# Patient Record
Sex: FEMALE | Race: WHITE | NOT HISPANIC OR LATINO | URBAN - METROPOLITAN AREA
[De-identification: names, ages, dates, MRNs, and addresses within clinical notes are randomized per-mention and may not be internally consistent; named-entity substitution may affect disease eponyms.]

---

## 2024-01-23 ENCOUNTER — TRANSFERRED RECORDS (OUTPATIENT)
Dept: HEALTH INFORMATION MANAGEMENT | Facility: CLINIC | Age: 59
End: 2024-01-23

## 2024-02-27 ENCOUNTER — TRANSFERRED RECORDS (OUTPATIENT)
Dept: HEALTH INFORMATION MANAGEMENT | Facility: CLINIC | Age: 59
End: 2024-02-27

## 2024-04-05 ENCOUNTER — TRANSFERRED RECORDS (OUTPATIENT)
Dept: HEALTH INFORMATION MANAGEMENT | Facility: CLINIC | Age: 59
End: 2024-04-05

## 2024-04-24 ENCOUNTER — TRANSFERRED RECORDS (OUTPATIENT)
Dept: HEALTH INFORMATION MANAGEMENT | Facility: CLINIC | Age: 59
End: 2024-04-24

## 2024-06-06 ENCOUNTER — TRANSFERRED RECORDS (OUTPATIENT)
Dept: HEALTH INFORMATION MANAGEMENT | Facility: CLINIC | Age: 59
End: 2024-06-06

## 2024-07-18 ENCOUNTER — TRANSFERRED RECORDS (OUTPATIENT)
Dept: HEALTH INFORMATION MANAGEMENT | Facility: CLINIC | Age: 59
End: 2024-07-18

## 2024-08-21 ENCOUNTER — TRANSFERRED RECORDS (OUTPATIENT)
Dept: HEALTH INFORMATION MANAGEMENT | Facility: CLINIC | Age: 59
End: 2024-08-21

## 2025-01-06 ENCOUNTER — APPOINTMENT (OUTPATIENT)
Dept: MRI IMAGING | Facility: HOSPITAL | Age: 60
DRG: 580 | End: 2025-01-06
Payer: COMMERCIAL

## 2025-01-06 ENCOUNTER — HOSPITAL ENCOUNTER (INPATIENT)
Facility: HOSPITAL | Age: 60
DRG: 580 | End: 2025-01-06
Attending: FAMILY MEDICINE | Admitting: FAMILY MEDICINE
Payer: COMMERCIAL

## 2025-01-06 DIAGNOSIS — L02.512 ABSCESS OF LEFT THUMB: Primary | ICD-10-CM

## 2025-01-06 DIAGNOSIS — K21.00 GASTROESOPHAGEAL REFLUX DISEASE WITH ESOPHAGITIS WITHOUT HEMORRHAGE: ICD-10-CM

## 2025-01-06 DIAGNOSIS — L02.91 PHLEGMONOUS CELLULITIS: ICD-10-CM

## 2025-01-06 DIAGNOSIS — L03.90 CELLULITIS: ICD-10-CM

## 2025-01-06 DIAGNOSIS — G43.909 MIGRAINE WITHOUT STATUS MIGRAINOSUS, NOT INTRACTABLE, UNSPECIFIED MIGRAINE TYPE: ICD-10-CM

## 2025-01-06 LAB
ANION GAP SERPL CALCULATED.3IONS-SCNC: 13 MMOL/L (ref 7–15)
BASOPHILS # BLD AUTO: 0 10E3/UL (ref 0–0.2)
BASOPHILS NFR BLD AUTO: 0 %
BUN SERPL-MCNC: 10.9 MG/DL (ref 8–23)
CALCIUM SERPL-MCNC: 9.7 MG/DL (ref 8.8–10.4)
CHLORIDE SERPL-SCNC: 102 MMOL/L (ref 98–107)
CREAT SERPL-MCNC: 0.73 MG/DL (ref 0.51–0.95)
CRP SERPL-MCNC: 37.3 MG/L
EGFRCR SERPLBLD CKD-EPI 2021: >90 ML/MIN/1.73M2
EOSINOPHIL # BLD AUTO: 0 10E3/UL (ref 0–0.7)
EOSINOPHIL NFR BLD AUTO: 0 %
ERYTHROCYTE [DISTWIDTH] IN BLOOD BY AUTOMATED COUNT: 12.3 % (ref 10–15)
GLUCOSE SERPL-MCNC: 116 MG/DL (ref 70–99)
HCO3 SERPL-SCNC: 24 MMOL/L (ref 22–29)
HCT VFR BLD AUTO: 40.8 % (ref 35–47)
HGB BLD-MCNC: 13.8 G/DL (ref 11.7–15.7)
IMM GRANULOCYTES # BLD: 0 10E3/UL
IMM GRANULOCYTES NFR BLD: 0 %
LYMPHOCYTES # BLD AUTO: 2 10E3/UL (ref 0.8–5.3)
LYMPHOCYTES NFR BLD AUTO: 18 %
MAGNESIUM SERPL-MCNC: 2 MG/DL (ref 1.7–2.3)
MCH RBC QN AUTO: 31.6 PG (ref 26.5–33)
MCHC RBC AUTO-ENTMCNC: 33.8 G/DL (ref 31.5–36.5)
MCV RBC AUTO: 93 FL (ref 78–100)
MONOCYTES # BLD AUTO: 0.7 10E3/UL (ref 0–1.3)
MONOCYTES NFR BLD AUTO: 7 %
NEUTROPHILS # BLD AUTO: 8.4 10E3/UL (ref 1.6–8.3)
NEUTROPHILS NFR BLD AUTO: 75 %
NRBC # BLD AUTO: 0 10E3/UL
NRBC BLD AUTO-RTO: 0 /100
PHOSPHATE SERPL-MCNC: 3 MG/DL (ref 2.5–4.5)
PLATELET # BLD AUTO: 326 10E3/UL (ref 150–450)
POTASSIUM SERPL-SCNC: 4 MMOL/L (ref 3.4–5.3)
PROCALCITONIN SERPL IA-MCNC: 0.03 NG/ML
RBC # BLD AUTO: 4.37 10E6/UL (ref 3.8–5.2)
SODIUM SERPL-SCNC: 139 MMOL/L (ref 135–145)
WBC # BLD AUTO: 11.2 10E3/UL (ref 4–11)

## 2025-01-06 PROCEDURE — 120N000001 HC R&B MED SURG/OB

## 2025-01-06 PROCEDURE — 96375 TX/PRO/DX INJ NEW DRUG ADDON: CPT

## 2025-01-06 PROCEDURE — 85018 HEMOGLOBIN: CPT | Performed by: EMERGENCY MEDICINE

## 2025-01-06 PROCEDURE — 250N000011 HC RX IP 250 OP 636: Mod: JZ | Performed by: EMERGENCY MEDICINE

## 2025-01-06 PROCEDURE — 250N000013 HC RX MED GY IP 250 OP 250 PS 637

## 2025-01-06 PROCEDURE — 73220 MRI UPPR EXTREMITY W/O&W/DYE: CPT | Mod: LT

## 2025-01-06 PROCEDURE — 80048 BASIC METABOLIC PNL TOTAL CA: CPT | Performed by: EMERGENCY MEDICINE

## 2025-01-06 PROCEDURE — 83735 ASSAY OF MAGNESIUM: CPT | Performed by: EMERGENCY MEDICINE

## 2025-01-06 PROCEDURE — 250N000011 HC RX IP 250 OP 636: Performed by: FAMILY MEDICINE

## 2025-01-06 PROCEDURE — 85004 AUTOMATED DIFF WBC COUNT: CPT | Performed by: EMERGENCY MEDICINE

## 2025-01-06 PROCEDURE — 258N000003 HC RX IP 258 OP 636: Performed by: FAMILY MEDICINE

## 2025-01-06 PROCEDURE — 84100 ASSAY OF PHOSPHORUS: CPT | Performed by: FAMILY MEDICINE

## 2025-01-06 PROCEDURE — 36415 COLL VENOUS BLD VENIPUNCTURE: CPT | Performed by: EMERGENCY MEDICINE

## 2025-01-06 PROCEDURE — 84145 PROCALCITONIN (PCT): CPT | Performed by: EMERGENCY MEDICINE

## 2025-01-06 PROCEDURE — 96374 THER/PROPH/DIAG INJ IV PUSH: CPT | Mod: 59

## 2025-01-06 PROCEDURE — 250N000011 HC RX IP 250 OP 636

## 2025-01-06 PROCEDURE — 87040 BLOOD CULTURE FOR BACTERIA: CPT | Performed by: EMERGENCY MEDICINE

## 2025-01-06 PROCEDURE — 86140 C-REACTIVE PROTEIN: CPT | Performed by: EMERGENCY MEDICINE

## 2025-01-06 PROCEDURE — 99285 EMERGENCY DEPT VISIT HI MDM: CPT | Mod: 25

## 2025-01-06 RX ORDER — TRAZODONE HYDROCHLORIDE 50 MG/1
100 TABLET, FILM COATED ORAL AT BEDTIME
Status: DISCONTINUED | OUTPATIENT
Start: 2025-01-06 | End: 2025-01-12 | Stop reason: HOSPADM

## 2025-01-06 RX ORDER — TRAZODONE HYDROCHLORIDE 100 MG/1
100 TABLET ORAL AT BEDTIME
COMMUNITY
Start: 2024-10-07

## 2025-01-06 RX ORDER — SERTRALINE HYDROCHLORIDE 100 MG/1
100 TABLET, FILM COATED ORAL DAILY
Status: DISCONTINUED | OUTPATIENT
Start: 2025-01-07 | End: 2025-01-12 | Stop reason: HOSPADM

## 2025-01-06 RX ORDER — ACETAMINOPHEN 650 MG/1
650 SUPPOSITORY RECTAL EVERY 4 HOURS PRN
Status: DISCONTINUED | OUTPATIENT
Start: 2025-01-06 | End: 2025-01-07

## 2025-01-06 RX ORDER — CEFTRIAXONE 2 G/1
2 INJECTION, POWDER, FOR SOLUTION INTRAMUSCULAR; INTRAVENOUS ONCE
Status: COMPLETED | OUTPATIENT
Start: 2025-01-06 | End: 2025-01-06

## 2025-01-06 RX ORDER — OXYCODONE HYDROCHLORIDE 5 MG/1
5 TABLET ORAL EVERY 4 HOURS PRN
Status: DISCONTINUED | OUTPATIENT
Start: 2025-01-06 | End: 2025-01-07

## 2025-01-06 RX ORDER — LIDOCAINE 40 MG/G
CREAM TOPICAL
Status: DISCONTINUED | OUTPATIENT
Start: 2025-01-06 | End: 2025-01-12 | Stop reason: HOSPADM

## 2025-01-06 RX ORDER — ONDANSETRON 2 MG/ML
4 INJECTION INTRAMUSCULAR; INTRAVENOUS EVERY 6 HOURS PRN
Status: DISCONTINUED | OUTPATIENT
Start: 2025-01-06 | End: 2025-01-12 | Stop reason: HOSPADM

## 2025-01-06 RX ORDER — CALCIUM CARBONATE 500 MG/1
1000 TABLET, CHEWABLE ORAL 4 TIMES DAILY PRN
Status: DISCONTINUED | OUTPATIENT
Start: 2025-01-06 | End: 2025-01-12 | Stop reason: HOSPADM

## 2025-01-06 RX ORDER — ACETAMINOPHEN 325 MG/1
650 TABLET ORAL EVERY 4 HOURS PRN
Status: DISCONTINUED | OUTPATIENT
Start: 2025-01-06 | End: 2025-01-07

## 2025-01-06 RX ORDER — AMOXICILLIN 250 MG
2 CAPSULE ORAL 2 TIMES DAILY PRN
Status: DISCONTINUED | OUTPATIENT
Start: 2025-01-06 | End: 2025-01-11

## 2025-01-06 RX ORDER — DIPHENHYDRAMINE HCL 25 MG
25 CAPSULE ORAL EVERY 6 HOURS PRN
Status: DISCONTINUED | OUTPATIENT
Start: 2025-01-06 | End: 2025-01-12 | Stop reason: HOSPADM

## 2025-01-06 RX ORDER — NALOXONE HYDROCHLORIDE 0.4 MG/ML
0.4 INJECTION, SOLUTION INTRAMUSCULAR; INTRAVENOUS; SUBCUTANEOUS
Status: DISCONTINUED | OUTPATIENT
Start: 2025-01-06 | End: 2025-01-12 | Stop reason: HOSPADM

## 2025-01-06 RX ORDER — PANTOPRAZOLE SODIUM 40 MG/1
40 TABLET, DELAYED RELEASE ORAL
Status: DISCONTINUED | OUTPATIENT
Start: 2025-01-07 | End: 2025-01-12 | Stop reason: HOSPADM

## 2025-01-06 RX ORDER — NALOXONE HYDROCHLORIDE 0.4 MG/ML
0.2 INJECTION, SOLUTION INTRAMUSCULAR; INTRAVENOUS; SUBCUTANEOUS
Status: DISCONTINUED | OUTPATIENT
Start: 2025-01-06 | End: 2025-01-12 | Stop reason: HOSPADM

## 2025-01-06 RX ORDER — MORPHINE SULFATE 4 MG/ML
4 INJECTION, SOLUTION INTRAMUSCULAR; INTRAVENOUS ONCE
Status: COMPLETED | OUTPATIENT
Start: 2025-01-06 | End: 2025-01-06

## 2025-01-06 RX ORDER — CEFTRIAXONE 2 G/1
2 INJECTION, POWDER, FOR SOLUTION INTRAMUSCULAR; INTRAVENOUS EVERY 24 HOURS
Status: DISCONTINUED | OUTPATIENT
Start: 2025-01-07 | End: 2025-01-09

## 2025-01-06 RX ORDER — PROCHLORPERAZINE MALEATE 10 MG
10 TABLET ORAL EVERY 6 HOURS PRN
Status: DISCONTINUED | OUTPATIENT
Start: 2025-01-06 | End: 2025-01-12 | Stop reason: HOSPADM

## 2025-01-06 RX ORDER — AMOXICILLIN 250 MG
1 CAPSULE ORAL 2 TIMES DAILY PRN
Status: DISCONTINUED | OUTPATIENT
Start: 2025-01-06 | End: 2025-01-11

## 2025-01-06 RX ORDER — DIPHENHYDRAMINE HYDROCHLORIDE 50 MG/ML
25 INJECTION INTRAMUSCULAR; INTRAVENOUS EVERY 6 HOURS PRN
Status: DISCONTINUED | OUTPATIENT
Start: 2025-01-06 | End: 2025-01-12 | Stop reason: HOSPADM

## 2025-01-06 RX ORDER — KETOROLAC TROMETHAMINE 15 MG/ML
15 INJECTION, SOLUTION INTRAMUSCULAR; INTRAVENOUS ONCE
Status: COMPLETED | OUTPATIENT
Start: 2025-01-06 | End: 2025-01-06

## 2025-01-06 RX ORDER — ONDANSETRON 4 MG/1
4 TABLET, ORALLY DISINTEGRATING ORAL EVERY 6 HOURS PRN
Status: DISCONTINUED | OUTPATIENT
Start: 2025-01-06 | End: 2025-01-12 | Stop reason: HOSPADM

## 2025-01-06 RX ORDER — ONDANSETRON 2 MG/ML
4 INJECTION INTRAMUSCULAR; INTRAVENOUS ONCE
Status: COMPLETED | OUTPATIENT
Start: 2025-01-06 | End: 2025-01-06

## 2025-01-06 RX ORDER — ACYCLOVIR 400 MG/1
400 TABLET ORAL DAILY
Status: DISCONTINUED | OUTPATIENT
Start: 2025-01-07 | End: 2025-01-12 | Stop reason: HOSPADM

## 2025-01-06 RX ORDER — SERTRALINE HYDROCHLORIDE 100 MG/1
1 TABLET, FILM COATED ORAL DAILY
COMMUNITY
Start: 2024-10-08

## 2025-01-06 RX ADMIN — ONDANSETRON 4 MG: 2 INJECTION INTRAMUSCULAR; INTRAVENOUS at 17:49

## 2025-01-06 RX ADMIN — SODIUM CHLORIDE 2000 MG: 9 INJECTION, SOLUTION INTRAVENOUS at 18:05

## 2025-01-06 RX ADMIN — MORPHINE SULFATE 4 MG: 4 INJECTION, SOLUTION INTRAMUSCULAR; INTRAVENOUS at 17:51

## 2025-01-06 RX ADMIN — ACETAMINOPHEN 650 MG: 325 TABLET ORAL at 20:06

## 2025-01-06 RX ADMIN — CEFTRIAXONE SODIUM 2 G: 2 INJECTION, POWDER, FOR SOLUTION INTRAMUSCULAR; INTRAVENOUS at 17:52

## 2025-01-06 RX ADMIN — KETOROLAC TROMETHAMINE 15 MG: 15 INJECTION, SOLUTION INTRAMUSCULAR; INTRAVENOUS at 20:54

## 2025-01-06 RX ADMIN — DIPHENHYDRAMINE HYDROCHLORIDE 25 MG: 50 INJECTION, SOLUTION INTRAMUSCULAR; INTRAVENOUS at 20:54

## 2025-01-06 RX ADMIN — OXYCODONE HYDROCHLORIDE 5 MG: 5 TABLET ORAL at 20:06

## 2025-01-06 ASSESSMENT — COLUMBIA-SUICIDE SEVERITY RATING SCALE - C-SSRS
6. HAVE YOU EVER DONE ANYTHING, STARTED TO DO ANYTHING, OR PREPARED TO DO ANYTHING TO END YOUR LIFE?: NO
2. HAVE YOU ACTUALLY HAD ANY THOUGHTS OF KILLING YOURSELF IN THE PAST MONTH?: NO
1. IN THE PAST MONTH, HAVE YOU WISHED YOU WERE DEAD OR WISHED YOU COULD GO TO SLEEP AND NOT WAKE UP?: NO

## 2025-01-06 ASSESSMENT — ACTIVITIES OF DAILY LIVING (ADL)
ADLS_ACUITY_SCORE: 41

## 2025-01-06 NOTE — ED TRIAGE NOTES
Referred by [facility] urgency room for evaluation of poss infected left thumb. Thumb very reddened. Started on Saturday.     Triage Assessment (Adult)       Row Name 01/06/25 3517          Triage Assessment    Airway WDL WDL        Respiratory WDL    Respiratory WDL WDL        Skin Circulation/Temperature WDL    Skin Circulation/Temperature WDL X  reddened left thumb        Cardiac WDL    Cardiac WDL WDL        Peripheral/Neurovascular WDL    Peripheral Neurovascular WDL WDL        Cognitive/Neuro/Behavioral WDL    Cognitive/Neuro/Behavioral WDL WDL

## 2025-01-06 NOTE — ED NOTES
Pt reports increased swelling of right thumb/hand/wrist over the past hour and started tingling.  MD aware.

## 2025-01-06 NOTE — PHARMACY-ADMISSION MEDICATION HISTORY
Pharmacist Admission Medication History    Admission medication history is complete. The information provided in this note is only as accurate as the sources available at the time of the update.    Information Source(s): Patient and CareEverywhere/SureScripts via in-person    Pertinent Information: Patient also reported taking multiple vitamins, minerals, and holistic meds, but could not remember the names of all of them    Changes made to PTA medication list:  Added: Omeprazole, sertraline, Ocean nasal spray  Deleted: Azelastine nasal spray, doxycycline, fluocinolone drops  Changed: Trazodone from 50 mg to 100 mg    Allergies reviewed with patient and updates made in EHR: yes    Medication History Completed By: ELISSA WYMAN RPH 1/6/2025 4:58 PM    PTA Med List   Medication Sig Last Dose/Taking    acyclovir (ZOVIRAX) 400 MG tablet Take 400 mg by mouth daily. 1/6/2025 Morning    omeprazole (PRILOSEC) 20 MG DR capsule Take 20 mg by mouth daily. 1/6/2025 Morning    sertraline (ZOLOFT) 100 MG tablet Take 1 tablet by mouth daily. 1/6/2025 Morning    sodium chloride (OCEAN) 0.65 % nasal spray Spray 1 spray into both nostrils daily as needed for congestion. 1/6/2025 Morning    traZODone (DESYREL) 100 MG tablet Take 100 mg by mouth at bedtime. 1/5/2025 Bedtime

## 2025-01-06 NOTE — ED PROVIDER NOTES
"Emergency Department Midlevel Supervisory Note     I had a face to face encounter with this patient seen by the Advanced Practice Provider (JANET). I personally made/approved the management plan and take responsibility for the patient management. I personally saw patient and performed a substantive portion of the visit including all aspects of the medical decision making.     ED Course:  4:43 PM  Petty Durham PA-C staffed patient with me. I agree with their assessment and plan of management, and I will see the patient. I met with the patient to introduce myself, gather additional history, perform my initial exam, and discuss the plan.     EKG: I reviewed and independently interpreted the patient's EKG, with comments made as listed below. Please see scanned EKG for full report.   ***    Procedures:  I was present for the key portions of procedures documented in JANET/midlevel note, see midlevel note for further details.    MDM:  Patient with pain and redness of the left hand for a couple days.  Vitally stable, elevated CRP, mild leukocytosis.       No diagnosis found.    Consults:  I discussed the patient with *** who recommends ***    Brief HPI:     Arlene Dominguez is a 59 year old female who presents for evaluation of left hand pain and swelling.    Brief Physical Exam: BP (!) 144/74   Pulse 92   Temp 98.5  F (36.9  C) (Oral)   Resp 18   Ht 1.651 m (5' 5\")   Wt 102.6 kg (226 lb 3.2 oz)   SpO2 94%   BMI 37.64 kg/m    Physical Exam      Labs and Imaging:  Results for orders placed or performed during the hospital encounter of 01/06/25   Basic metabolic panel   Result Value Ref Range    Sodium 139 135 - 145 mmol/L    Potassium 4.0 3.4 - 5.3 mmol/L    Chloride 102 98 - 107 mmol/L    Carbon Dioxide (CO2) 24 22 - 29 mmol/L    Anion Gap 13 7 - 15 mmol/L    Urea Nitrogen 10.9 8.0 - 23.0 mg/dL    Creatinine 0.73 0.51 - 0.95 mg/dL    GFR Estimate >90 >60 mL/min/1.73m2    Calcium 9.7 8.8 - 10.4 mg/dL    Glucose 116 (H) " 70 - 99 mg/dL   Result Value Ref Range    Procalcitonin 0.03 <0.50 ng/mL   Result Value Ref Range    Magnesium 2.0 1.7 - 2.3 mg/dL   Result Value Ref Range    CRP Inflammation 37.30 (H) <5.00 mg/L   CBC with platelets and differential   Result Value Ref Range    WBC Count 11.2 (H) 4.0 - 11.0 10e3/uL    RBC Count 4.37 3.80 - 5.20 10e6/uL    Hemoglobin 13.8 11.7 - 15.7 g/dL    Hematocrit 40.8 35.0 - 47.0 %    MCV 93 78 - 100 fL    MCH 31.6 26.5 - 33.0 pg    MCHC 33.8 31.5 - 36.5 g/dL    RDW 12.3 10.0 - 15.0 %    Platelet Count 326 150 - 450 10e3/uL    % Neutrophils 75 %    % Lymphocytes 18 %    % Monocytes 7 %    % Eosinophils 0 %    % Basophils 0 %    % Immature Granulocytes 0 %    NRBCs per 100 WBC 0 <1 /100    Absolute Neutrophils 8.4 (H) 1.6 - 8.3 10e3/uL    Absolute Lymphocytes 2.0 0.8 - 5.3 10e3/uL    Absolute Monocytes 0.7 0.0 - 1.3 10e3/uL    Absolute Eosinophils 0.0 0.0 - 0.7 10e3/uL    Absolute Basophils 0.0 0.0 - 0.2 10e3/uL    Absolute Immature Granulocytes 0.0 <=0.4 10e3/uL    Absolute NRBCs 0.0 10e3/uL         Steve Underwood M.D.  [facility] EMERGENCY DEPARTMENT  [address] [place] [id number] [phone]

## 2025-01-06 NOTE — PHARMACY-VANCOMYCIN DOSING SERVICE
Pharmacy Vancomycin Initial Note  Date of Service 2025  Patient's  1965  59 year old, female    Indication: Skin and Soft Tissue Infection (cellulitis, possible flexor tenosynovitis)    Current estimated CrCl = Estimated Creatinine Clearance: 98.5 mL/min (based on SCr of 0.73 mg/dL).    Creatinine for last 3 days  2025:  3:30 PM Creatinine 0.73 mg/dL    Recent Vancomycin Level(s) for last 3 days  No results found for requested labs within last 3 days.      Vancomycin IV Administrations (past 72 hours)        No vancomycin orders with administrations in past 72 hours.                    Nephrotoxins and other renal medications (From now, onward)      Start     Dose/Rate Route Frequency Ordered Stop    25 1700  vancomycin (VANCOCIN) 2,000 mg in 0.9% NaCl 520 mL intermittent infusion         20 mg/kg × 102.6 kg  over 2 Hours Intravenous ONCE 25 1653              Contrast Orders - past 72 hours (72h ago, onward)      None            Plan:  Start vancomycin 2000 mg IV x1  Please reconsult pharmacy if planning to continue inpatient     ELISSA WYMAN, [facility]

## 2025-01-06 NOTE — ED PROVIDER NOTES
EMERGENCY DEPARTMENT ENCOUNTER      NAME: Arlene Dominguez  AGE: 59 year old female  YOB: 1965  MRN: [id number]  EVALUATION DATE & TIME: 1/6/2025  4:29 PM    PCP: Lauren Bowens    ED PROVIDER: Petty Durham PA-C      Chief Complaint   Patient presents with    poss infected thumb    reddened thumb         FINAL IMPRESSION:  1. Cellulitis          MEDICAL DECISION MAKING:    Pertinent Labs & Imaging studies reviewed. (See chart for details)  59 year old female presents to the Emergency Department for evaluation of left thumb/hand redness, pain and swelling.  The patient reports having a small cyst to her left hand/thumb that has been present for several years.  It Became red, swollen and painful over the weekend and worse today.  She went to urgent care see room and was referred to the ER for further evaluation.  On my examination, patient was initially hypertensive at 144/74 but otherwise vitally stable.  Examination with 2+ radial pulses.  Erythema, tenderness send mild fluctuance to the palmar aspect of the left thumb as pictured below.  Unable to flex the thumb however distal CMS otherwise intact.  Able to flex all other digits of the left hand.  Mild dorsal swelling of the left hand over the first and second metacarpals.  No significant erythema.  Differential diagnosis included flexor tenosynovitis, cellulitis, abscess, septic joint.    Workup was initiated in the waiting room due to critical capacity and I first initiated contact with the patient in the lobby because of this as well.  CBC with slightly elevated white blood cell count of 11.2.  Glucose minimally elevated at 116.  Procalcitonin negative at 0.03.  Magnesium within normal limits.  CRP elevated at 37.30.  Blood cultures sent and pending at this time.  I spoke with  from hand surgery at [facility] who after discussion of patient's history and exam agrees with admission and will plan for OR tomorrow.  Will give IV  ceftriaxone and vancomycin for coverage.  Pain controlled here with medications.  Discussed need for admission with patient and they were in agreement understanding.  Spoke with the resident service who accepted for admission.    Medical Decision Making  Obtained supplemental history:Supplemental history obtained?: No  Reviewed external records: External records reviewed?: Outpatient Record: 1/6/2024, [facility] impacted by chronic illness:Documented in Chart  Did you consider but not order tests?: Work up considered but not performed and documented in chart, if applicable  Did you interpret images independently?: Independent interpretation of ECG and images noted in documentation, when applicable.  Consultation discussion with other provider:Did you involve another provider (consultant, MH, pharmacy, etc.)?: I discussed the care with another health care provider, see documentation for details.  Admit.    MIPS: Not Applicable       ED COURSE:  4:15 PM I met with the patient, obtained history, performed an initial exam, and discussed options and plan for diagnostics and treatment here in the ED.  5:12 PM I spoke with Dr. Gomez, Orthopedics, regarding the patient's care. Recommended MRI, admission and NPO at midnight planning for procedure tomorrow.  5:52 PM I spoke with the resident service who accepted for admission.     At the conclusion of the encounter I discussed the results of all of the tests and the disposition. The questions were answered. The patient or family acknowledged understanding and was agreeable with the care plan.     MEDICATIONS GIVEN IN THE EMERGENCY:  Medications   vancomycin (VANCOCIN) 2,000 mg in 0.9% NaCl 520 mL intermittent infusion (2,000 mg Intravenous $New Bag 1/6/25 1806)   morphine (PF) injection 4 mg (4 mg Intravenous $Given 1/6/25 0055)   ondansetron (ZOFRAN) injection 4 mg (4 mg Intravenous $Given 1/6/25 7071)   cefTRIAXone (ROCEPHIN) 2 g vial to attach to   ml bag for ADULTS or NS 50 ml bag for PEDS (0 g Intravenous Stopped 1/6/25 1810)       NEW PRESCRIPTIONS STARTED AT TODAY'S ER VISIT  New Prescriptions    No medications on file            =================================================================    HPI:    Patient information was obtained from: Patient    Use of Interpretor: N/A         Arlene Dominguez is a 59 year old female with a pertinent history of anxiety, depression, GERD, osteoarthritis of hand, TMJ disease who presents to this ED by walk-in for evaluation of a possible thumb infection with redness.     Per chart review: Patient was seen earlier today (1/6/2025) at the [facility] for evaluation of significant soft tissue swelling and erythema of her left thumb and hand area. Due to a history and exam concerning for deep space infection/cellulitis, patient was advised to present to the emergency department.     Patient reports that she has had a cyst at the base of her left thumb for a few years now, however, this has since begun to swell significantly over the past few months and has swollen to an even greater extent today, prompting her Urgent Care visit as noted above. This swollen area became erythematous on 1/4/2025 (two days ago) and has been very painful for her recently. She has also experienced a new sensation of tingling from her left wrist to left thumb with this as well. Now in the emergency department, the patient is unable to flex her left thumb due to the swelling. Patient states that she has not attempted to pop or interfere with the swollen area in any significant manner, although she does mention having applied tea tree oil on it prior to arrival.     Patient denies a fever or any other symptoms at this time.       REVIEW OF SYSTEMS:  Negative unless otherwise stated in the above HPI.       PAST MEDICAL HISTORY:  No past medical history on file.    PAST SURGICAL HISTORY:  No past surgical history on  "file.        CURRENT MEDICATIONS:      Current Facility-Administered Medications:     vancomycin (VANCOCIN) 2,000 mg in 0.9% NaCl 520 mL intermittent infusion, 20 mg/kg, Intravenous, Once, Steve Underwood MD, 2,000 mg at 01/06/25 1804    Current Outpatient Medications:     acyclovir (ZOVIRAX) 400 MG tablet, Take 400 mg by mouth daily., Disp: , Rfl:     omeprazole (PRILOSEC) 20 MG DR capsule, Take 20 mg by mouth daily., Disp: , Rfl:     sertraline (ZOLOFT) 100 MG tablet, Take 1 tablet by mouth daily., Disp: , Rfl:     sodium chloride (OCEAN) 0.65 % nasal spray, Spray 1 spray into both nostrils daily as needed for congestion., Disp: , Rfl:     traZODone (DESYREL) 100 MG tablet, Take 100 mg by mouth at bedtime., Disp: , Rfl:       ALLERGIES:  No Known Allergies    FAMILY HISTORY:  No family history on file.    SOCIAL HISTORY:   Social History     Socioeconomic History    Marital status:        VITALS:  Patient Vitals for the past 24 hrs:   BP Temp Temp src Pulse Resp SpO2 Height Weight   01/06/25 1350 (!) 144/74 98.5  F (36.9  C) Oral 92 18 94 % 1.651 m (5' 5\") 102.6 kg (226 lb 3.2 oz)       PHYSICAL EXAM    Constitutional: Well developed, Well nourished, NAD  HENT: Normocephalic, Atraumatic, Bilateral external ears normal, Oropharynx normal, mucous membranes moist, Nose normal.   Neck: Normal range of motion, No tenderness, Supple, No stridor.  Eyes: Eyes track normally throughout exam, Conjunctiva normal, No discharge.   Musculoskeletal: 2+ radial pulses.  Erythema, tenderness send mild fluctuance to the palmar aspect of the left thumb as pictured below.  Unable to flex the thumb however distal CMS otherwise intact.  Able to flex all other digits of the left hand.  Mild dorsal swelling of the left hand over the first and second metacarpals.  No significant erythema.  Integument: Warm, Dry, No erythema, No rash. No petechiae.  Neurologic: Alert & oriented x 3, Normal motor function, Normal sensory function, " No focal deficits noted. Normal gait.  Psychiatric: Affect normal, Judgment normal, Mood normal. Cooperative.        LAB:  All pertinent labs reviewed and interpreted.  Recent Results (from the past 24 hours)   Basic metabolic panel    Collection Time: 01/06/25  3:30 PM   Result Value Ref Range    Sodium 139 135 - 145 mmol/L    Potassium 4.0 3.4 - 5.3 mmol/L    Chloride 102 98 - 107 mmol/L    Carbon Dioxide (CO2) 24 22 - 29 mmol/L    Anion Gap 13 7 - 15 mmol/L    Urea Nitrogen 10.9 8.0 - 23.0 mg/dL    Creatinine 0.73 0.51 - 0.95 mg/dL    GFR Estimate >90 >60 mL/min/1.73m2    Calcium 9.7 8.8 - 10.4 mg/dL    Glucose 116 (H) 70 - 99 mg/dL   Procalcitonin    Collection Time: 01/06/25  3:30 PM   Result Value Ref Range    Procalcitonin 0.03 <0.50 ng/mL   Magnesium    Collection Time: 01/06/25  3:30 PM   Result Value Ref Range    Magnesium 2.0 1.7 - 2.3 mg/dL   CRP inflammation    Collection Time: 01/06/25  3:30 PM   Result Value Ref Range    CRP Inflammation 37.30 (H) <5.00 mg/L   CBC with platelets and differential    Collection Time: 01/06/25  3:30 PM   Result Value Ref Range    WBC Count 11.2 (H) 4.0 - 11.0 10e3/uL    RBC Count 4.37 3.80 - 5.20 10e6/uL    Hemoglobin 13.8 11.7 - 15.7 g/dL    Hematocrit 40.8 35.0 - 47.0 %    MCV 93 78 - 100 fL    MCH 31.6 26.5 - 33.0 pg    MCHC 33.8 31.5 - 36.5 g/dL    RDW 12.3 10.0 - 15.0 %    Platelet Count 326 150 - 450 10e3/uL    % Neutrophils 75 %    % Lymphocytes 18 %    % Monocytes 7 %    % Eosinophils 0 %    % Basophils 0 %    % Immature Granulocytes 0 %    NRBCs per 100 WBC 0 <1 /100    Absolute Neutrophils 8.4 (H) 1.6 - 8.3 10e3/uL    Absolute Lymphocytes 2.0 0.8 - 5.3 10e3/uL    Absolute Monocytes 0.7 0.0 - 1.3 10e3/uL    Absolute Eosinophils 0.0 0.0 - 0.7 10e3/uL    Absolute Basophils 0.0 0.0 - 0.2 10e3/uL    Absolute Immature Granulocytes 0.0 <=0.4 10e3/uL    Absolute NRBCs 0.0 10e3/uL         RADIOLOGY:  Reviewed all pertinent imaging. Please see official radiology  report.  MR Hand Left w/o & w Contrast    (Results Pending)       PROCEDURES:   None      I, Adrian Melendez, am serving as a scribe to document services personally performed by Petty Durham PA-C based on my observation and the provider's statements to me. I, Petty Durham PA-C attest that Adrian Melendez is acting in a scribe capacity, has observed my performance of the services and has documented them in accordance with my direction.    Petty Durham PA-C  Emergency Medicine  [facility]  1/6/2025      Petty Durham PA-C  01/06/25 2018

## 2025-01-07 ENCOUNTER — ANESTHESIA EVENT (OUTPATIENT)
Dept: SURGERY | Facility: HOSPITAL | Age: 60
End: 2025-01-07
Payer: COMMERCIAL

## 2025-01-07 ENCOUNTER — ANESTHESIA (OUTPATIENT)
Dept: SURGERY | Facility: HOSPITAL | Age: 60
End: 2025-01-07
Payer: COMMERCIAL

## 2025-01-07 LAB
ANION GAP SERPL CALCULATED.3IONS-SCNC: 12 MMOL/L (ref 7–15)
BACTERIA SPEC CULT: ABNORMAL
BACTERIA SPEC CULT: NORMAL
BUN SERPL-MCNC: 10 MG/DL (ref 8–23)
CALCIUM SERPL-MCNC: 9.6 MG/DL (ref 8.8–10.4)
CHLORIDE SERPL-SCNC: 103 MMOL/L (ref 98–107)
CREAT SERPL-MCNC: 0.87 MG/DL (ref 0.51–0.95)
CRP SERPL-MCNC: 74.2 MG/L
EGFRCR SERPLBLD CKD-EPI 2021: 76 ML/MIN/1.73M2
ERYTHROCYTE [DISTWIDTH] IN BLOOD BY AUTOMATED COUNT: 12.3 % (ref 10–15)
EST. AVERAGE GLUCOSE BLD GHB EST-MCNC: 111 MG/DL
GLUCOSE BLDC GLUCOMTR-MCNC: 108 MG/DL (ref 70–99)
GLUCOSE SERPL-MCNC: 94 MG/DL (ref 70–99)
GRAM STAIN RESULT: ABNORMAL
GRAM STAIN RESULT: ABNORMAL
GRAM STAIN RESULT: NORMAL
GRAM STAIN RESULT: NORMAL
HBA1C MFR BLD: 5.5 %
HCO3 SERPL-SCNC: 26 MMOL/L (ref 22–29)
HCT VFR BLD AUTO: 40.4 % (ref 35–47)
HGB BLD-MCNC: 13.8 G/DL (ref 11.7–15.7)
MAGNESIUM SERPL-MCNC: 2 MG/DL (ref 1.7–2.3)
MCH RBC QN AUTO: 32 PG (ref 26.5–33)
MCHC RBC AUTO-ENTMCNC: 34.2 G/DL (ref 31.5–36.5)
MCV RBC AUTO: 94 FL (ref 78–100)
PHOSPHATE SERPL-MCNC: 3.8 MG/DL (ref 2.5–4.5)
PLATELET # BLD AUTO: 272 10E3/UL (ref 150–450)
POTASSIUM SERPL-SCNC: 3.6 MMOL/L (ref 3.4–5.3)
RBC # BLD AUTO: 4.31 10E6/UL (ref 3.8–5.2)
SODIUM SERPL-SCNC: 141 MMOL/L (ref 135–145)
WBC # BLD AUTO: 10.8 10E3/UL (ref 4–11)

## 2025-01-07 PROCEDURE — 83735 ASSAY OF MAGNESIUM: CPT | Performed by: FAMILY MEDICINE

## 2025-01-07 PROCEDURE — 258N000001 HC RX 258: Performed by: PHYSICIAN ASSISTANT

## 2025-01-07 PROCEDURE — 87077 CULTURE AEROBIC IDENTIFY: CPT | Performed by: ORTHOPAEDIC SURGERY

## 2025-01-07 PROCEDURE — 258N000003 HC RX IP 258 OP 636: Performed by: STUDENT IN AN ORGANIZED HEALTH CARE EDUCATION/TRAINING PROGRAM

## 2025-01-07 PROCEDURE — 250N000011 HC RX IP 250 OP 636: Performed by: STUDENT IN AN ORGANIZED HEALTH CARE EDUCATION/TRAINING PROGRAM

## 2025-01-07 PROCEDURE — 250N000013 HC RX MED GY IP 250 OP 250 PS 637

## 2025-01-07 PROCEDURE — 36415 COLL VENOUS BLD VENIPUNCTURE: CPT

## 2025-01-07 PROCEDURE — 84100 ASSAY OF PHOSPHORUS: CPT | Performed by: FAMILY MEDICINE

## 2025-01-07 PROCEDURE — 250N000009 HC RX 250: Performed by: STUDENT IN AN ORGANIZED HEALTH CARE EDUCATION/TRAINING PROGRAM

## 2025-01-07 PROCEDURE — 250N000011 HC RX IP 250 OP 636: Performed by: PHYSICIAN ASSISTANT

## 2025-01-07 PROCEDURE — 83036 HEMOGLOBIN GLYCOSYLATED A1C: CPT

## 2025-01-07 PROCEDURE — 999N000141 HC STATISTIC PRE-PROCEDURE NURSING ASSESSMENT: Performed by: ORTHOPAEDIC SURGERY

## 2025-01-07 PROCEDURE — 272N000001 HC OR GENERAL SUPPLY STERILE: Performed by: ORTHOPAEDIC SURGERY

## 2025-01-07 PROCEDURE — 80048 BASIC METABOLIC PNL TOTAL CA: CPT

## 2025-01-07 PROCEDURE — 360N000075 HC SURGERY LEVEL 2, PER MIN: Performed by: ORTHOPAEDIC SURGERY

## 2025-01-07 PROCEDURE — 120N000001 HC R&B MED SURG/OB

## 2025-01-07 PROCEDURE — 87070 CULTURE OTHR SPECIMN AEROBIC: CPT | Performed by: ORTHOPAEDIC SURGERY

## 2025-01-07 PROCEDURE — 87205 SMEAR GRAM STAIN: CPT | Performed by: ORTHOPAEDIC SURGERY

## 2025-01-07 PROCEDURE — 82310 ASSAY OF CALCIUM: CPT

## 2025-01-07 PROCEDURE — 85018 HEMOGLOBIN: CPT

## 2025-01-07 PROCEDURE — 87075 CULTR BACTERIA EXCEPT BLOOD: CPT | Performed by: ORTHOPAEDIC SURGERY

## 2025-01-07 PROCEDURE — 86140 C-REACTIVE PROTEIN: CPT | Performed by: PHYSICIAN ASSISTANT

## 2025-01-07 PROCEDURE — A9585 GADOBUTROL INJECTION: HCPCS

## 2025-01-07 PROCEDURE — 84295 ASSAY OF SERUM SODIUM: CPT

## 2025-01-07 PROCEDURE — 250N000013 HC RX MED GY IP 250 OP 250 PS 637: Performed by: FAMILY MEDICINE

## 2025-01-07 PROCEDURE — 255N000002 HC RX 255 OP 636

## 2025-01-07 PROCEDURE — 0JBK0ZZ EXCISION OF LEFT HAND SUBCUTANEOUS TISSUE AND FASCIA, OPEN APPROACH: ICD-10-PCS | Performed by: ORTHOPAEDIC SURGERY

## 2025-01-07 PROCEDURE — 250N000011 HC RX IP 250 OP 636: Mod: JW | Performed by: STUDENT IN AN ORGANIZED HEALTH CARE EDUCATION/TRAINING PROGRAM

## 2025-01-07 PROCEDURE — 258N000003 HC RX IP 258 OP 636: Performed by: FAMILY MEDICINE

## 2025-01-07 PROCEDURE — 99223 1ST HOSP IP/OBS HIGH 75: CPT | Mod: AI

## 2025-01-07 PROCEDURE — 370N000017 HC ANESTHESIA TECHNICAL FEE, PER MIN: Performed by: ORTHOPAEDIC SURGERY

## 2025-01-07 PROCEDURE — 250N000011 HC RX IP 250 OP 636: Performed by: FAMILY MEDICINE

## 2025-01-07 PROCEDURE — 85049 AUTOMATED PLATELET COUNT: CPT

## 2025-01-07 RX ORDER — NALOXONE HYDROCHLORIDE 0.4 MG/ML
0.1 INJECTION, SOLUTION INTRAMUSCULAR; INTRAVENOUS; SUBCUTANEOUS
Status: DISCONTINUED | OUTPATIENT
Start: 2025-01-07 | End: 2025-01-07 | Stop reason: HOSPADM

## 2025-01-07 RX ORDER — ONDANSETRON 4 MG/1
4 TABLET, ORALLY DISINTEGRATING ORAL EVERY 30 MIN PRN
Status: CANCELLED | OUTPATIENT
Start: 2025-01-07

## 2025-01-07 RX ORDER — FENTANYL CITRATE 50 UG/ML
25 INJECTION, SOLUTION INTRAMUSCULAR; INTRAVENOUS EVERY 5 MIN PRN
Status: CANCELLED | OUTPATIENT
Start: 2025-01-07

## 2025-01-07 RX ORDER — PROPOFOL 10 MG/ML
INJECTION, EMULSION INTRAVENOUS CONTINUOUS PRN
Status: DISCONTINUED | OUTPATIENT
Start: 2025-01-07 | End: 2025-01-07

## 2025-01-07 RX ORDER — FENTANYL CITRATE 50 UG/ML
50 INJECTION, SOLUTION INTRAMUSCULAR; INTRAVENOUS
Status: COMPLETED | OUTPATIENT
Start: 2025-01-07 | End: 2025-01-07

## 2025-01-07 RX ORDER — GADOBUTROL 604.72 MG/ML
10 INJECTION INTRAVENOUS ONCE
Status: COMPLETED | OUTPATIENT
Start: 2025-01-07 | End: 2025-01-07

## 2025-01-07 RX ORDER — BUPIVACAINE HYDROCHLORIDE 5 MG/ML
INJECTION, SOLUTION EPIDURAL; INTRACAUDAL
Status: COMPLETED | OUTPATIENT
Start: 2025-01-07 | End: 2025-01-07

## 2025-01-07 RX ORDER — ACETAMINOPHEN 325 MG/1
975 TABLET ORAL 3 TIMES DAILY
Status: DISCONTINUED | OUTPATIENT
Start: 2025-01-07 | End: 2025-01-12 | Stop reason: HOSPADM

## 2025-01-07 RX ORDER — LIDOCAINE HYDROCHLORIDE 10 MG/ML
INJECTION, SOLUTION INFILTRATION; PERINEURAL PRN
Status: DISCONTINUED | OUTPATIENT
Start: 2025-01-07 | End: 2025-01-07

## 2025-01-07 RX ORDER — ONDANSETRON 2 MG/ML
INJECTION INTRAMUSCULAR; INTRAVENOUS PRN
Status: DISCONTINUED | OUTPATIENT
Start: 2025-01-07 | End: 2025-01-07

## 2025-01-07 RX ORDER — NALOXONE HYDROCHLORIDE 1 MG/ML
0.1 INJECTION INTRAMUSCULAR; INTRAVENOUS; SUBCUTANEOUS
Status: CANCELLED | OUTPATIENT
Start: 2025-01-07

## 2025-01-07 RX ORDER — POLYETHYLENE GLYCOL 3350 17 G/17G
17 POWDER, FOR SOLUTION ORAL DAILY
Status: DISCONTINUED | OUTPATIENT
Start: 2025-01-08 | End: 2025-01-11

## 2025-01-07 RX ORDER — OXYCODONE HYDROCHLORIDE 5 MG/1
5 TABLET ORAL
Status: CANCELLED | OUTPATIENT
Start: 2025-01-07

## 2025-01-07 RX ORDER — LIDOCAINE 40 MG/G
CREAM TOPICAL
Status: DISCONTINUED | OUTPATIENT
Start: 2025-01-07 | End: 2025-01-12 | Stop reason: HOSPADM

## 2025-01-07 RX ORDER — SODIUM CHLORIDE, SODIUM LACTATE, POTASSIUM CHLORIDE, CALCIUM CHLORIDE 600; 310; 30; 20 MG/100ML; MG/100ML; MG/100ML; MG/100ML
INJECTION, SOLUTION INTRAVENOUS CONTINUOUS
Status: DISCONTINUED | OUTPATIENT
Start: 2025-01-07 | End: 2025-01-07 | Stop reason: HOSPADM

## 2025-01-07 RX ORDER — OXYCODONE HYDROCHLORIDE 5 MG/1
5 TABLET ORAL EVERY 4 HOURS PRN
Status: DISCONTINUED | OUTPATIENT
Start: 2025-01-07 | End: 2025-01-12 | Stop reason: HOSPADM

## 2025-01-07 RX ORDER — LIDOCAINE 40 MG/G
CREAM TOPICAL
Status: DISCONTINUED | OUTPATIENT
Start: 2025-01-07 | End: 2025-01-07 | Stop reason: HOSPADM

## 2025-01-07 RX ORDER — PROCHLORPERAZINE MALEATE 10 MG
10 TABLET ORAL EVERY 6 HOURS PRN
Status: DISCONTINUED | OUTPATIENT
Start: 2025-01-07 | End: 2025-01-07

## 2025-01-07 RX ORDER — FLUMAZENIL 0.1 MG/ML
0.2 INJECTION, SOLUTION INTRAVENOUS
Status: DISCONTINUED | OUTPATIENT
Start: 2025-01-07 | End: 2025-01-07 | Stop reason: HOSPADM

## 2025-01-07 RX ORDER — DEXAMETHASONE SODIUM PHOSPHATE 4 MG/ML
4 INJECTION, SOLUTION INTRA-ARTICULAR; INTRALESIONAL; INTRAMUSCULAR; INTRAVENOUS; SOFT TISSUE
Status: CANCELLED | OUTPATIENT
Start: 2025-01-07

## 2025-01-07 RX ORDER — OXYCODONE HYDROCHLORIDE 5 MG/1
10 TABLET ORAL EVERY 4 HOURS PRN
Status: DISCONTINUED | OUTPATIENT
Start: 2025-01-07 | End: 2025-01-12 | Stop reason: HOSPADM

## 2025-01-07 RX ORDER — HYDROMORPHONE HCL IN WATER/PF 6 MG/30 ML
0.4 PATIENT CONTROLLED ANALGESIA SYRINGE INTRAVENOUS
Status: DISCONTINUED | OUTPATIENT
Start: 2025-01-07 | End: 2025-01-12 | Stop reason: HOSPADM

## 2025-01-07 RX ORDER — SODIUM CHLORIDE, SODIUM LACTATE, POTASSIUM CHLORIDE, CALCIUM CHLORIDE 600; 310; 30; 20 MG/100ML; MG/100ML; MG/100ML; MG/100ML
INJECTION, SOLUTION INTRAVENOUS CONTINUOUS
Status: CANCELLED | OUTPATIENT
Start: 2025-01-07

## 2025-01-07 RX ORDER — BISACODYL 10 MG
10 SUPPOSITORY, RECTAL RECTAL DAILY PRN
Status: DISCONTINUED | OUTPATIENT
Start: 2025-01-10 | End: 2025-01-12 | Stop reason: HOSPADM

## 2025-01-07 RX ORDER — ONDANSETRON 2 MG/ML
4 INJECTION INTRAMUSCULAR; INTRAVENOUS EVERY 30 MIN PRN
Status: CANCELLED | OUTPATIENT
Start: 2025-01-07

## 2025-01-07 RX ORDER — FENTANYL CITRATE 50 UG/ML
50 INJECTION, SOLUTION INTRAMUSCULAR; INTRAVENOUS EVERY 5 MIN PRN
Status: CANCELLED | OUTPATIENT
Start: 2025-01-07

## 2025-01-07 RX ORDER — AMOXICILLIN 250 MG
1 CAPSULE ORAL 2 TIMES DAILY
Status: DISCONTINUED | OUTPATIENT
Start: 2025-01-07 | End: 2025-01-11

## 2025-01-07 RX ORDER — ONDANSETRON 4 MG/1
4 TABLET, ORALLY DISINTEGRATING ORAL EVERY 6 HOURS PRN
Status: DISCONTINUED | OUTPATIENT
Start: 2025-01-07 | End: 2025-01-07

## 2025-01-07 RX ORDER — OXYCODONE HYDROCHLORIDE 5 MG/1
10 TABLET ORAL
Status: CANCELLED | OUTPATIENT
Start: 2025-01-07

## 2025-01-07 RX ORDER — ONDANSETRON 2 MG/ML
4 INJECTION INTRAMUSCULAR; INTRAVENOUS EVERY 6 HOURS PRN
Status: DISCONTINUED | OUTPATIENT
Start: 2025-01-07 | End: 2025-01-07

## 2025-01-07 RX ADMIN — OXYCODONE HYDROCHLORIDE 5 MG: 5 TABLET ORAL at 01:13

## 2025-01-07 RX ADMIN — ONDANSETRON 4 MG: 2 INJECTION INTRAMUSCULAR; INTRAVENOUS at 17:49

## 2025-01-07 RX ADMIN — ACYCLOVIR 400 MG: 400 TABLET ORAL at 11:38

## 2025-01-07 RX ADMIN — LIDOCAINE HYDROCHLORIDE 5 ML: 10 INJECTION, SOLUTION INFILTRATION; PERINEURAL at 17:49

## 2025-01-07 RX ADMIN — OXYCODONE HYDROCHLORIDE 5 MG: 5 TABLET ORAL at 06:21

## 2025-01-07 RX ADMIN — FENTANYL CITRATE 50 MCG: 50 INJECTION, SOLUTION INTRAMUSCULAR; INTRAVENOUS at 14:08

## 2025-01-07 RX ADMIN — ACETAMINOPHEN, ASPIRIN, CAFFEINE 1 TABLET: 250; 250; 65 TABLET, FILM COATED ORAL at 22:10

## 2025-01-07 RX ADMIN — PANTOPRAZOLE SODIUM 40 MG: 40 TABLET, DELAYED RELEASE ORAL at 06:30

## 2025-01-07 RX ADMIN — PROPOFOL 125 MCG/KG/MIN: 10 INJECTION, EMULSION INTRAVENOUS at 17:49

## 2025-01-07 RX ADMIN — DEXMEDETOMIDINE HYDROCHLORIDE 8 MCG: 100 INJECTION, SOLUTION INTRAVENOUS at 17:54

## 2025-01-07 RX ADMIN — GADOBUTROL 10 ML: 604.72 INJECTION INTRAVENOUS at 00:20

## 2025-01-07 RX ADMIN — BUPIVACAINE HYDROCHLORIDE 25 ML: 5 INJECTION, SOLUTION EPIDURAL; INTRACAUDAL; PERINEURAL at 15:10

## 2025-01-07 RX ADMIN — FENTANYL CITRATE 50 MCG: 50 INJECTION, SOLUTION INTRAMUSCULAR; INTRAVENOUS at 14:22

## 2025-01-07 RX ADMIN — TRAZODONE HYDROCHLORIDE 100 MG: 100 TABLET ORAL at 01:35

## 2025-01-07 RX ADMIN — SERTRALINE HYDROCHLORIDE 100 MG: 100 TABLET ORAL at 09:34

## 2025-01-07 RX ADMIN — SODIUM CHLORIDE 1250 MG: 9 INJECTION, SOLUTION INTRAVENOUS at 07:35

## 2025-01-07 RX ADMIN — ACETAMINOPHEN 650 MG: 325 TABLET ORAL at 09:37

## 2025-01-07 RX ADMIN — OXYCODONE HYDROCHLORIDE 10 MG: 5 TABLET ORAL at 11:38

## 2025-01-07 RX ADMIN — TRAZODONE HYDROCHLORIDE 100 MG: 100 TABLET ORAL at 22:09

## 2025-01-07 RX ADMIN — SODIUM CHLORIDE, POTASSIUM CHLORIDE, SODIUM LACTATE AND CALCIUM CHLORIDE: 600; 310; 30; 20 INJECTION, SOLUTION INTRAVENOUS at 14:10

## 2025-01-07 ASSESSMENT — ACTIVITIES OF DAILY LIVING (ADL)
ADLS_ACUITY_SCORE: 42
ADLS_ACUITY_SCORE: 41
ADLS_ACUITY_SCORE: 42
ADLS_ACUITY_SCORE: 41
ADLS_ACUITY_SCORE: 42
ADLS_ACUITY_SCORE: 19
ADLS_ACUITY_SCORE: 42
ADLS_ACUITY_SCORE: 42
DEPENDENT_IADLS:: INDEPENDENT
ADLS_ACUITY_SCORE: 19
ADLS_ACUITY_SCORE: 19
ADLS_ACUITY_SCORE: 22
ADLS_ACUITY_SCORE: 42
ADLS_ACUITY_SCORE: 42
ADLS_ACUITY_SCORE: 19
ADLS_ACUITY_SCORE: 41
ADLS_ACUITY_SCORE: 19
ADLS_ACUITY_SCORE: 41
ADLS_ACUITY_SCORE: 19

## 2025-01-07 ASSESSMENT — ENCOUNTER SYMPTOMS: SEIZURES: 0

## 2025-01-07 NOTE — PLAN OF CARE
Problem: Adult Inpatient Plan of Care  Goal: Optimal Comfort and Wellbeing  Intervention: Monitor Pain and Promote Comfort  Recent Flowsheet Documentation  Taken 1/7/2025 1130 by Schuyler Thapa, RN  Pain Management Interventions:   medication (see MAR)   breathing exercises   care clustered   distraction   emotional support   pillow support provided   quiet environment facilitated   relaxation techniques promoted   repositioned   rest   therapeutic presence  Taken 1/7/2025 1030 by Schuyler Thapa, RN  Pain Management Interventions:   breathing exercises   care clustered   distraction   emotional support   pillow support provided   quiet environment facilitated   relaxation techniques promoted   repositioned   rest   therapeutic presence  Taken 1/7/2025 0930 by Schuyler Thapa, RN  Pain Management Interventions:   medication (see MAR)   breathing exercises   care clustered   distraction   emotional support   pillow support provided   quiet environment facilitated   relaxation techniques promoted   repositioned   rest   therapeutic presence     Problem: Infection  Goal: Absence of Infection Signs and Symptoms  Outcome: Progressing   Goal Outcome Evaluation:       VSS on RA. A&Ox4. L hallux pain managed w/ PRN tylenol & oxy. Up SBA. K, mag, & phos protocols OK, recheck w/ gene am labs. Taken for I&D of L hallux.

## 2025-01-07 NOTE — ANESTHESIA PROCEDURE NOTES
"Brachial plexus Procedure Note    Pre-Procedure   Staff -        Anesthesiologist:  Ramon Dinh MD       Performed By: anesthesiologist       Procedure Start/Stop Times: 1/7/2025 3:00 PM and 1/7/2025 3:10 PM       Pre-Anesthestic Checklist: patient identified, IV checked, site marked, risks and benefits discussed, informed consent, monitors and equipment checked, pre-op evaluation, at physician/surgeon's request and post-op pain management  Timeout:       Correct Patient: Yes        Correct Procedure: Yes        Correct Site: Yes        Correct Position: Yes        Correct Laterality: Yes        Site Marked: Yes  Procedure Documentation  Procedure: Brachial plexus         Laterality: left       Patient Position: supine       Skin prep: Chloraprep (supraclavicular approach).       Needle Type: short bevel       Needle Gauge: 21.        Needle Length (Inches): 4        Ultrasound guided       1. Ultrasound was used to identify targeted nerve, plexus, vascular marker, or fascial plane and place a needle adjacent to it in real-time.       2. Ultrasound was used to visualize the spread of anesthetic in close proximity to the above referenced structure.       3. A permanent image is entered into the patient's record.       4. The visualized anatomic structures appeared normal.       5. There were no apparent abnormal pathologic findings.    Assessment/Narrative         The placement was negative for: blood aspirated, painful injection and site bleeding       Paresthesias: No.       Bolus given via needle..        Secured via.        Insertion/Infusion Method: Single Shot    Medication(s) Administered   Bupivacaine 0.5% PF (Infiltration) - Infiltration   25 mL - 1/7/2025 3:10:00 PM  Medication Administration Time: 1/7/2025 3:00 PM      FOR [facility] ([facility]) ONLY:   Pain Team Contact information: please page the Pain Team Via [employer]. Search \"Pain\". During daytime hours, please page the attending first. At night " please page the resident first.

## 2025-01-07 NOTE — PLAN OF CARE
Goal Outcome Evaluation:       Patient came up from ER. L thumb pain 6. Swollen under thumb red/purplish. Has some tingling in hand,  Hand is swollen. Patient went to MRI

## 2025-01-07 NOTE — CONSULTS
ORTHOPEDIC CONSULTATION    Consultation  Arlene Dominguez,  1965, MRN [id number]    Cellulitis [L03.90]    PCP: Lauren Bowens, [phone]   Code status:  Full Code       Extended Emergency Contact Information  Primary Emergency Contact: Benny Weaver  Address: [address] [place] [address]            [place] [address] [place]  Home Phone: [phone]  Relation: Spouse         IMPRESSION:  59-year-old female with left thumb abscess over the volar aspect of the first MCP and metacarpal head area     PLAN:  This patient was discussed with Dr. Gomez, on-call surgeon for [facility] and they are in agreement with the following plan.   -Plan for surgical incision and drainage this afternoon.  -Keep n.p.o. until time of surgery.  -Continue IV antibiotics per primary team.  -Weightbearing as tolerated to the left upper extremity.  -Pain control per primary team    Thank you for including [facility] in the care of Arlene Dominguez. It has been a pleasure participating in the care of this patient.        CHIEF COMPLAINT: <principal problem not specified>    HISTORY OF PRESENT ILLNESS:  The patient is seen in orthopedic consultation at the request of Piyush Garza MD for left thumb abscess.  The patient is a 59 year old female    The patient presents today with severe pain, swelling, and erythema over the volar aspect of the base of her thumb.  She notes that for years she has had a nontender cyst in this area that has not bothered her much, she assumed it was a ganglion cyst.  She has never had anything done for this cyst in the past.  She has never tried to incise the cyst on her own.  Over the last 2 days she developed progressively worsening swelling, erythema, and pain in this area.  She presented to the urgent care yesterday and she was directed to come to the emergency department where she was admitted for IV antibiotics.  She feels that the symptoms are worsening overnight even while on  "these IV antibiotics.  She denies fever/chills today.      ALLERGIES:   Review of patient's allergies indicates No Known Allergies      MEDICATIONS UPON ADMISSION:  Medications were reviewed.  They include:   Medications Prior to Admission   Medication Sig Dispense Refill Last Dose/Taking    acyclovir (ZOVIRAX) 400 MG tablet Take 400 mg by mouth daily.   1/6/2025 Morning    omeprazole (PRILOSEC) 20 MG DR capsule Take 20 mg by mouth daily.   1/6/2025 Morning    sertraline (ZOLOFT) 100 MG tablet Take 1 tablet by mouth daily.   1/6/2025 Morning    sodium chloride (OCEAN) 0.65 % nasal spray Spray 1 spray into both nostrils daily as needed for congestion.   1/6/2025 Morning    traZODone (DESYREL) 100 MG tablet Take 100 mg by mouth at bedtime.   1/5/2025 Bedtime         SOCIAL HISTORY:   she        FAMILY HISTORY:  family history is not on file.      REVIEW OF SYSTEMS:   Reviewed with patient. See HPI, otherwise negative       PHYSICAL EXAMINATION:  Vitals: /55 (BP Location: Left arm)   Pulse 92   Temp 98.9  F (37.2  C) (Oral)   Resp 18   Ht 1.651 m (5' 5\")   Wt 102.6 kg (226 lb 3.2 oz)   SpO2 93%   BMI 37.64 kg/m    General: On examination, the patient is resting comfortably, NAD, awake, and alert and oriented to person, place, time, and, and general circumstances   SKIN: There is severe swelling and erythema over the volar aspect of the first MCP and metacarpal head area.  There is a pinpoint area of pus accumulating under the skin.  Significant fluctuance in this area indicative of a abscess/phlegmon.  Able to comfortably rest thumb in a neutral position, does not feel more comfortable in a flexed position.  Pulses:  Brachial and radial pulse is intact and equal bilaterally  Sensation: intact and equal bilaterally to the distal upper extremities.  Tenderness: Severely tender to palpation over the swollen area  ROM: She is able to flex/extend at the MCP and IP joint of the thumb, range of motion of both " joints is very limited due to swelling and pain.  She does have pain with passive extension of the finger    Contralateral side= Full range of motion, Negative joint instability findings, 5/5 motor groups about the joint, Non-tender.       RADIOGRAPHIC EVALUATION:  Personally reviewed  EXAM: MR HAND LEFT W/O and W CONTRAST  LOCATION: [facility]  DATE: 1/7/2025     INDICATION: Left first digit pain, edema, and erythema.  COMPARISON: None.  TECHNIQUE: Routine. Additional postgadolinium T1 sequences were obtained.  IV CONTRAST: 10ml Gadavist     FINDINGS:      TENDONS:   -Extensor tendons: No tendon tear, tendinopathy, or tenosynovitis.  -Flexor tendons: No tendinous tear, tendinopathy, or tenosynovitis.     LIGAMENTS:  -Visualized collateral and capsular ligaments: Normal.     JOINTS AND BONES:   -Mild degenerative changes of the first carpometacarpal joint with associated subchondral edema and tiny subchondral cystic change.  -No significant joint effusion.  -No evidence of acute fracture or dislocation.  -No T1 hypointense marrow replacement. No other marrow edema. No abnormal enhancement.     MUSCLES AND SOFT TISSUES:   -In the subcutaneous fat volar to the first MCP joint, there is a focal nonenhancing area demonstrating T1 hypointensity and T2/STIR intermediate signal. This measures 2.5 x 2.0 x 0.9 cm (series 9, image 25; series 10, image 17). Extensive surrounding   subcutaneous edema and enhancement.  -Circumferential subcutaneous edema of the first digit. Subcutaneous edema of the dorsum of the hand.  -No rim-enhancing fluid collection.  -Minimal edema and enhancement involving the thenar musculature, likely reactive.                                                                      IMPRESSION:  1.  Focal 2.5 cm nonenhancing area within the subcutaneous fat volar to the first MCP joint, with extensive adjacent subcutaneous edema and enhancement, suspicious for phlegmon and  cellulitis. No discrete abscess.     2.  No tenosynovitis. No evidence of osteomyelitis.    PERTINENT LABS:  Personally reviewed  Recent Labs   Lab Test 01/07/25  0629   HGB 13.8            RAGHAVENDRA LEAL PA-C  Date: 1/7/2025  Time: 10:06 AM  [facility]    CC1:   Piyush Garza MD    CC2:   Lauren Bowens        **I saw and examined the patient and agree with the LY Leal's clinical exam and assessment.  I have recommended surgical intervention and I am planning on taking the patient to the OR this evening for an I&D.**

## 2025-01-07 NOTE — PROGRESS NOTES
"[facility]    Medicine Progress Note - Hospitalist Service    Date of Admission:  1/6/2025    Assessment & Plan      Arlene Dominguez is a 59 year old female admitted on 1/6/2025. She has a history of depression and insomnia and is admitted for evaluation of right first digit erythema/edema, planning for I&D w/ortho today.     Left first digit MCP phlegmon    Presents from  for 2d of worsening pain, edema, and erythema on volar aspect of L first MCP and metacarpal head. Afebrile, but had WBC 11.2, CRP 37.3 on admission. Procal wnl. MR L hand suggestive of cellulitis, negative for internal abscess, osteomyelitis, and tenosynovitis. Skin surrounding L thumb appears worse today - center now violaceous. Flexion/opposition of thumb limited d/t pain/swelling. Pain not well-controlled on current regimen.   -Ortho consult, recs appreciated    -Plan for I&D this afternoon, NPO until then.    -Continue vancomycin and ceftriaxone IV (1/6 - current)   -WBAT LUE  -Pain regimen:   -Tylenol 975mg PO TID   -Increase oxycodone to 5-10mg q4h PRN for moderate pain   -Add-on dilaudid 0.4mg IV q2h PRN  -Add-on A1c  -BCX x2, pending  -OT/CM consult. Okay to hold off on PT for now.     Depression  Insomnia   - Continue PTA sertraline and trazodone     Chronic conditions  - GERD: PTA omeprazole  - HSV: PTA acyclovir           Diet: NPO per Anesthesia Guidelines for Procedure/Surgery Except for: Meds    DVT Prophylaxis: VTE Prophylaxis contraindicated due to plan for procedure  Mckeon Catheter: Not present  Lines: None     Cardiac Monitoring: None  Code Status: Full Code      Clinically Significant Risk Factors Present on Admission                             # Obesity: Estimated body mass index is 37.64 kg/m  as calculated from the following:    Height as of this encounter: 1.651 m (5' 5\").    Weight as of this encounter: 102.6 kg (226 lb 3.2 oz).       # Financial/Environmental Concerns: none         Social " Drivers of Health    Tobacco Use: Medium Risk (7/27/2021)    Received from [facility]    Patient History     Smoking Tobacco Use: Former     Smokeless Tobacco Use: Never          Disposition Plan     Medically Ready for Discharge: Anticipated in 2-4 Days           The patient's care was discussed with the Attending Physician, Dr. Garza .    Luke Alba MD  Hospitalist Service  [facility]  Securely message with [employer] (more info)  Text page via [id number] Paging/Directory   ______________________________________________________________________    Interval History   NAOE. Pain in L thumb persistent - feels like a burning sensation. Denies chest pain, dyspnea, abdominal pain, n/v, fevers/chills.     Physical Exam   Vital Signs: Temp: 98.9  F (37.2  C) Temp src: Oral BP: 103/55 Pulse: 92   Resp: 18 SpO2: 93 % O2 Device: None (Room air)    Weight: 226 lbs 3.2 oz    Physical Exam  Constitutional:       General: She is not in acute distress.     Appearance: Normal appearance.   HENT:      Head: Normocephalic and atraumatic.   Eyes:      General: No scleral icterus.  Cardiovascular:      Rate and Rhythm: Normal rate and regular rhythm.      Heart sounds: No murmur heard.     No friction rub. No gallop.   Abdominal:      General: Abdomen is flat. There is no distension.      Palpations: Abdomen is soft.      Tenderness: There is no abdominal tenderness.   Musculoskeletal:      Comments: L thumb - flexion/opposition limited d/t pain. L wrist - eversion/inversion intact, flexion/extension intact, rotation intact.    Skin:     Comments: Center of L 1st digit MCP violaceous, erythema surrounding extending ~3cm in diameter. Significant edema, no drainage.    Neurological:      General: No focal deficit present.      Mental Status: She is alert and oriented to person, place, and time.   Psychiatric:      Comments: Anxious.         Medical Decision Making       ------------------ MEDICAL DECISION  MAKING ------------------------------------------------------------------------------------------------------      Data   ------------------------- PAST 24 HR DATA REVIEWED -----------------------------------------------

## 2025-01-07 NOTE — CONSULTS
Care Management Initial Consult    General Information  Assessment completed with: Arlene Riojas  Type of CM/SW Visit: Initial Assessment    Primary Care Provider verified and updated as needed: No   Readmission within the last 30 days: no previous admission in last 30 days      Reason for Consult: discharge planning  Advance Care Planning: Advance Care Planning Reviewed: no concerns identified        Communication Assessment  Patient's communication style: spoken language (English or Bilingual)        Cognitive  Cognitive/Neuro/Behavioral: WDL                      Living Environment:   People in home: spouse  Benny  Current living Arrangements: house      Able to return to prior arrangements: yes     Family/Social Support:  Care provided by: self  Provides care for: no one  Marital Status:   Support system:   Benny       Description of Support System: Supportive       Current Resources:   Patient receiving home care services: No     Community Resources: None  Equipment currently used at home: none  Supplies currently used at home: None    Employment/Financial:  Employment Status:          Financial Concerns: none   Referral to Financial Worker: No     Does the patient's insurance plan have a 3 day qualifying hospital stay waiver?  No    Lifestyle & Psychosocial Needs:  Social Drivers of Health     Food Insecurity: Not on file   Depression: Not on file   Housing Stability: Not on file   Tobacco Use: Medium Risk (7/27/2021)    Received from [facility]    Patient History     Smoking Tobacco Use: Former     Smokeless Tobacco Use: Never     Passive Exposure: Not on file   Financial Resource Strain: Not on file   Alcohol Use: Not on file   Transportation Needs: Not on file   Physical Activity: Not on file   Interpersonal Safety: Not on file   Stress: Not on file   Social Connections: Not on file   Health Literacy: Not on file     Functional Status:  Prior to admission patient needed assistance:    Dependent ADLs:: Independent  Dependent IADLs:: Independent     Mental Health Status:  Mental Health Status: No Current Concerns       Chemical Dependency Status:  Chemical Dependency Status: No Current Concerns           Values/Beliefs:  Spiritual, Cultural Beliefs, Zoroastrian Practices, Values that affect care: no             Discussed  Partnership in Safe Discharge Planning  document with patient/family: No    Additional Information:  Writer met with patient briefly to review role of care management services, discuss goals of care and assess need for any possible services at discharge. Patient alert, answering questions appropriately but minimally engaged in the conversation.  Address confirmed.  Patient is  and independent at baseline. Her goal is to return home at discharge and she denies any needs or concerns. However, writer explained that care management would continue to follow along in case ling term IV antibiotics are recommended.     Next Steps:  Care management will continue to monitor progression of care, review team recommendations and provide discharge planning assist as needed.      Petty Almaguer RN

## 2025-01-07 NOTE — PROGRESS NOTES
Assumed care for pt from [id number].  Pt is alert and oriented, VSS. C/o pain in left thumb that goes up to the left arm. PRN oxycodone and tylenol given as well as PRN IV toradol. Pt also c/o itching in her head and general body. Pt has had two doses of IV antibiotics. Pt does not have other rash in her body. Face and head red from pt scratching continuously. Pt reports itching is pain related and has had it in the past. Denies other reactions symptoms. No shortness of breath. PRN IV benadryl order obtained from resident and was given shortly before pt transferred to [facility]. Report given to Yarelis RN. MRI checklist done, still waiting to get MRI done.  at bedside and supportive.

## 2025-01-07 NOTE — H&P
"    [facility]    History and Physical - Hospitalist Service       Date of Admission:  1/6/2025    Assessment & Plan      Arlene Dominguez is a 59 year old female admitted on 1/6/2025. She has a history of depression and insomnia and is admitted for evaluation of right first digit erythema/edema.     Left first digit tenosynovitis vs. Cellulitis   Xuan presents to the emergency department.  Following recommendations from urgent care where she was seen earlier today for pain, edema, erythema of the left first digit.  She has had a chronic left first digit that she finds herself \"fussing with\".  This became progressively more inflamed about 2 days prior to admission which prompted her to go to urgent care.  X-rays were taken in urgent care which showed no joint space or bony abnormalities.  It was recommended she present to the emergency department.  Labs in the emergency department were notable for mild leukocytosis of 11.2 and elevated CRP at 37.3.  Procalcitonin within normal limits.  No other significant metabolic abnormalities.  Exam notable for significant erythema and edema of the right first digit especially around the first metacarpal phalangeal joint with surrounding edema affecting the left wrist.  Tenderness up to the mid forearm affecting the lateral aspect consistent with with tenosynovitis.  Overall picture concerning for tenosynovitis versus cellulitis of the first digit.  She was given ceftriaxone and vancomycin in the emergency department.  Orthopedic surgery was consulted, recommended continuing antibiotics and they will see her in the morning.  - Orthopedic surgery consulted, appreciate recommendations  - N.p.o. at midnight  - Continue ceftriaxone 2 g daily  - Continue vancomycin  - Tylenol every 6 as needed for pain  - Oxycodone p.o. for severe pain  - Blood cultures pending   - PT/OT/CM consults placed     Depression  Insomnia   - Continue PTA sertraline and trazodone " "    Chronic conditions  - GERD: PTA omeprazole  - HSV: PTA acyclovir       Diet: NPO per Anesthesia Guidelines for Procedure/Surgery Except for: Meds  NPO per Anesthesia Guidelines for Procedure/Surgery Except for: Meds    DVT Prophylaxis: Pneumatic Compression Devices  Mckeon Catheter: Not present  Fluids: None   Lines: None     Cardiac Monitoring: None  Code Status: Full Code      Clinically Significant Risk Factors Present on Admission                             # Obesity: Estimated body mass index is 37.64 kg/m  as calculated from the following:    Height as of this encounter: 1.651 m (5' 5\").    Weight as of this encounter: 102.6 kg (226 lb 3.2 oz).              Disposition Plan      Expected Discharge Date: 01/08/2025                The patient's care will be formally staffed at morning report.      Manisha Palumbo DO  Hospitalist Service  [facility]  Securely message with [employer] (more info)  Text page via [facility] Paging/Directory   ______________________________________________________________________    Chief Complaint   Left first digit pain and swelling    History is obtained from the patient    History of Present Illness   Arlene Dominguez is a 59 year old female who presents with of progressively worsening erythema, edema, pain of the left first digit.  Patient reports that she has history of a longstanding cyst at the base of her left palm which she frequently \"fusses with\" and has recently found herself taking it more frequently.  She states that she has never had any issues with this in the past but that she noticed progressively worsening pain and redness at the left first metacarpal phalangeal joint starting on the evening of 1/4 and progressively worsening prior to admission.  Range of motion was also significantly limited.  She had attempted taking Tylenol, ibuprofen, homemade tinctures without improvement in her pain.  She also tried icing and putting heat on the digit " without improvement.  She has not noted any draining from the area.  She denies fever, chills, abdominal pain, chest pain, shortness of breath, nausea, diarrhea, and constipation.     She was seen today at urgent care for worsening pain and swelling where she had an x-ray and was recommended she come to the emergency department for evaluation.    Past medical history notable only for mood disorder and insomnia.      Past Medical History    No past medical history on file.    Past Surgical History   No past surgical history on file.    Prior to Admission Medications   Prior to Admission Medications   Prescriptions Last Dose Informant Patient Reported? Taking?   acyclovir (ZOVIRAX) 400 MG tablet 1/6/2025 Morning  Yes Yes   Sig: Take 400 mg by mouth daily.   omeprazole (PRILOSEC) 20 MG DR capsule 1/6/2025 Morning  Yes Yes   Sig: Take 20 mg by mouth daily.   sertraline (ZOLOFT) 100 MG tablet 1/6/2025 Morning  Yes Yes   Sig: Take 1 tablet by mouth daily.   sodium chloride (OCEAN) 0.65 % nasal spray 1/6/2025 Morning  Yes Yes   Sig: Spray 1 spray into both nostrils daily as needed for congestion.   traZODone (DESYREL) 100 MG tablet 1/5/2025 Bedtime  Yes Yes   Sig: Take 100 mg by mouth at bedtime.      Facility-Administered Medications: None           Physical Exam   Vital Signs: Temp: 99.3  F (37.4  C) Temp src: Oral BP: 138/74 Pulse: 92   Resp: 18 SpO2: 97 % O2 Device: None (Room air)    Weight: 226 lbs 3.2 oz    Physical Exam  Constitutional:       General: She is not in acute distress.     Appearance: Normal appearance. She is not ill-appearing or toxic-appearing.   HENT:      Head: Normocephalic and atraumatic.      Mouth/Throat:      Mouth: Mucous membranes are moist.      Pharynx: Oropharynx is clear.   Cardiovascular:      Rate and Rhythm: Normal rate and regular rhythm.      Pulses: Normal pulses.      Heart sounds: Normal heart sounds.   Pulmonary:      Effort: Pulmonary effort is normal.      Breath sounds:  Normal breath sounds.   Abdominal:      General: Abdomen is flat. Bowel sounds are normal. There is no distension.      Palpations: Abdomen is soft.      Tenderness: There is no abdominal tenderness.   Musculoskeletal:      Right lower leg: No edema.      Left lower leg: No edema.      Comments: Significant edema and erythema of the left first digit at the first metacarpal phalangeal joint. Edema affecting the lateral aspect of the left hand to the midline. Erythema spreading up towards the left first distal phalangeal joint. Nearly absent ROM of the left first digit. Intact ROM of the left 2-5th digits and right hand. Tenderness and warmth on palpation of the left first digit and extending up the lateral aspect of the left forearm until about midway up the forearm. Consistent with the location of the abductor pollicis longus. No significant tenderness to palpation of the left palm or 2-4th digits or metacarpal joints.    Neurological:      General: No focal deficit present.      Mental Status: She is alert.   Psychiatric:         Mood and Affect: Mood normal.       Medical Decision Making             Data     I have personally reviewed the following data over the past 24 hrs:    11.2 (H)  \   13.8   / 326     139 102 10.9 /  116 (H)   4.0 24 0.73 \     Procal: 0.03 CRP: 37.30 (H) Lactic Acid: N/A         Imaging results reviewed over the past 24 hrs:   No results found for this or any previous visit (from the past 24 hours).

## 2025-01-07 NOTE — ANESTHESIA PREPROCEDURE EVALUATION
Anesthesia Pre-Procedure Evaluation    Patient: Arlene Dominguez   MRN: [id number] : 1965        Procedure : Procedure(s):  INCISION AND DRAINAGE, FINGER          History reviewed. No pertinent past medical history.   History reviewed. No pertinent surgical history.   No Known Allergies   Social History     Tobacco Use    Smoking status: Never    Smokeless tobacco: Never   Substance Use Topics    Alcohol use: Yes     Comment: 2-4 beers on friday and saturday      Wt Readings from Last 1 Encounters:   25 102.6 kg (226 lb 3.2 oz)        Anesthesia Evaluation            ROS/MED HX  ENT/Pulmonary:    (-) sleep apnea and recent URI   Neurologic:    (-) no seizures, no CVA and no TIA   Cardiovascular:    (-) hypertension and CAD   METS/Exercise Tolerance: >4 METS    Hematologic:       Musculoskeletal:       GI/Hepatic:  - neg GI/hepatic ROS   (+) GERD,                   Renal/Genitourinary:  - neg Renal ROS  (-) renal disease   Endo:  - neg endo ROS  (-) Type II DM and thyroid disease   Psychiatric/Substance Use:       Infectious Disease: Comment: cellulitis      Malignancy:       Other:            Physical Exam    Airway        Mallampati: II   TM distance: > 3 FB   Neck ROM: full   Mouth opening: > 3 cm    Respiratory Devices and Support         Dental       (+) Minor Abnormalities - some fillings, tiny chips      Cardiovascular   cardiovascular exam normal          Pulmonary   pulmonary exam normal                OUTSIDE LABS:  CBC:   Lab Results   Component Value Date    WBC 10.8 2025    WBC 11.2 (H) 2025    HGB 13.8 2025    HGB 13.8 2025    HCT 40.4 2025    HCT 40.8 2025     2025     2025     BMP:   Lab Results   Component Value Date     2025     2025    POTASSIUM 3.6 2025    POTASSIUM 4.0 2025    CHLORIDE 103 2025    CHLORIDE 102 2025    CO2 26 2025    CO2 24 2025    BUN 10.0  "01/07/2025    BUN 10.9 01/06/2025    CR 0.87 01/07/2025    CR 0.73 01/06/2025    GLC 94 01/07/2025     (H) 01/06/2025     COAGS: No results found for: \"PTT\", \"INR\", \"FIBR\"  POC: No results found for: \"BGM\", \"HCG\", \"HCGS\"  HEPATIC: No results found for: \"ALBUMIN\", \"PROTTOTAL\", \"ALT\", \"AST\", \"GGT\", \"ALKPHOS\", \"BILITOTAL\", \"BILIDIRECT\", \"REBEL\"  OTHER:   Lab Results   Component Value Date    A1C 5.5 01/07/2025    RAFAT 9.6 01/07/2025    PHOS 3.8 01/07/2025    MAG 2.0 01/07/2025       Anesthesia Plan    ASA Status:  2       Anesthesia Type: MAC.              Consents    Anesthesia Plan(s) and associated risks, benefits, and realistic alternatives discussed. Questions answered and patient/representative(s) expressed understanding.     - Discussed:     - Discussed with:  Patient            Postoperative Care    Pain management: IV analgesics, Peripheral nerve block (Single Shot), Oral pain medications.        Comments:               Ramon Dinh MD    I have reviewed the pertinent notes and labs in the chart from the past 30 days and (re)examined the patient.  Any updates or changes from those notes are reflected in this note.                         # Obesity: Estimated body mass index is 37.64 kg/m  as calculated from the following:    Height as of this encounter: 1.651 m (5' 5\").    Weight as of this encounter: 102.6 kg (226 lb 3.2 oz).       # Financial/Environmental Concerns: none        "

## 2025-01-07 NOTE — PLAN OF CARE
Goal Outcome Evaluation:      Plan of Care Reviewed With: patient          Outcome Evaluation: Patient anticipates returning home with no needs at discharge but care management will watch plan for antibiotics and therapy recommendations.

## 2025-01-07 NOTE — PHARMACY-VANCOMYCIN DOSING SERVICE
Pharmacy Vancomycin Initial Note  Date of Service 2025  Patient's  1965  59 year old, female    Indication: Bone and Joint Infection and Skin and Soft Tissue Infection    Current estimated CrCl = Estimated Creatinine Clearance: 98.5 mL/min (based on SCr of 0.73 mg/dL).    Creatinine for last 3 days  2025:  3:30 PM Creatinine 0.73 mg/dL    Recent Vancomycin Level(s) for last 3 days  No results found for requested labs within last 3 days.      Vancomycin IV Administrations (past 72 hours)                     vancomycin (VANCOCIN) 2,000 mg in 0.9% NaCl 520 mL intermittent infusion (mg) 2,000 mg New Bag 25 1805                    Nephrotoxins and other renal medications (From now, onward)      Start     Dose/Rate Route Frequency Ordered Stop    25 0800  acyclovir (ZOVIRAX) tablet 400 mg        Note to Pharmacy: PTA Sig:Take 400 mg by mouth daily.      400 mg Oral DAILY 25 1916      25 0600  vancomycin (VANCOCIN) 1,250 mg in 0.9% NaCl 262.5 mL intermittent infusion         1,250 mg  over 90 Minutes Intravenous EVERY 12 HOURS 25 1920      25 1700  vancomycin (VANCOCIN) 2,000 mg in 0.9% NaCl 520 mL intermittent infusion         20 mg/kg × 102.6 kg  over 2 Hours Intravenous ONCE 25 1653              Contrast Orders - past 72 hours (72h ago, onward)      None            InsightRX Prediction of Planned Initial Vancomycin Regimen  Regimen: 1250 mg IV every 12 hours.  Start time: 06:05 on 2025  Exposure target: AUC24 (range)400-600 mg/L.hr   AUC24,ss: 503 mg/L.hr  Probability of AUC24 > 400: 73 %  Ctrough,ss: 15.9 mg/L  Probability of Ctrough,ss > 20: 32 %  Probability of nephrotoxicity (Lodise GREGORY ): 11 %        Plan:  Start vancomycin  1250 mg IV q12h.   Vancomycin monitoring method: AUC  Vancomycin therapeutic monitoring goal: 400-600 mg*h/L  Pharmacy will check vancomycin levels as appropriate in 1-3 Days.    Serum creatinine levels will be ordered  daily for the first week of therapy and at least twice weekly for subsequent weeks.      Damir Salazar RPH

## 2025-01-07 NOTE — OP NOTE
"Date of Procedure: 1/7/2025    Surgeon: Wanda Gomez M.D.    Assistant: Wendy Golden PA-C PA-C assist required for patient positioning, soft tissue retraction, instrumentation assist, and patient's safety.    Preoperative diagnosis: Cellulitis and probable abscess of the left thumb/hand    Postoperative diagnosis: Cellulitis and abscess of the left thumb/hand    Operation/procedures performed: Incision and drainage of left thumb/hand abscess and excisional debridement of the wound    Sedation/anesthesia: Left regional block supplemented with MAC sedation    Complications: None    Drains:  Two segments of a 1/4\" was placed in the wound.    Estimated blood loss: 5 cc    Implants: None    Specimens: Two culture swabs were sent to microbiology for Gram stain, aerobic and anaerobic culture.    Indications for Surgery:The patient is a 59-year-old female who has had a small nontender cyst over the volar aspect of her left thumb MP joint for several years.  The cyst started enlarging and bothering her approximately 2 days ago.  She kept touching the area but did not try to lexii the cyst.  The following day, she developed increased pain, swelling along the volar aspect of her left thumb.  She went to an urgent care facility where she was directed to come to the [facility] ED.  She was placed on IV antibiotics.  Her symptoms have worsened even while on IV antibiotics.  Based on her history and clinical exam, I believe she has developed an abscess and surrounding cellulitis at the volar aspect of her thumb MP joint.  I have recommended taking her to the operating room for an I&D.  The risks, benefits, and alternatives of this surgical procedure were thoroughly discussed with the patient.  I outlined the risks of surgery which included, but are not limited to: Infection, recurrence, incisional pain, the development of scar tissue, and/or neurovascular injury. The consequences of such risks could include hospital " "admission, additional surgery, chronic pain, loss of strength, loss of sensation, loss of motion and/or loss of function. I explained that although these risks are low they are real. The patient then had opportunity to ask questions which I answered. After thorough discussion, the patient verbalized understanding and stated that they wished proceed with operative intervention.    Tourniquet time: Per operating room record    Disposition: Recovery room in good condition    Operation description: Patient was identified, informed consent was obtained, and the surgical site was marked in the holding area.  The anesthesiologist administered a left regional block.  The patient was then brought into the operating room and positioned supine with the left upper extremity positioned on an arm board.  The anesthetist then administered MAC sedation.    The patient's left upper extremity was then prepped and draped in the standard surgical fashion.  This included placing a well-padded tourniquet on the upper arm.  Prior to beginning the case a \"timeout\" was performed by the surgical team to confirm surgical site, side, and procedure.  The patient's left upper extremity was then elevated and the tourniquet was inflated to 250 mmHg.    I made a Silvino-type incision with limbs of approximately 2cm over the fluctuant area at the volar base of the thumb.  The incision was made with a 15 blade through skin only.  As soon as the surgical knife penetrated the dermis, purulent material oozed from the wound.  This white, thick pus was swabbed and sent as a specimen to microbiology for Gram stain, aerobic culture and anaerobic culture.      All the purulent material was evacuated from the pocket.  I then sharply debrided some necrotic tissue from the wound using a 15 blade and tenotomy scissors.  I sharply excised all the necrotic tissue (which was mostly necrotic fat) from the wound.  The wound was then copiously irrigated with 3L of NS " impregnated with 3L of Ancef.  Two small segment of a quarter inch Penrose drain were placed in the wound for ongoing drainage.  One of the Penrose drain exited dorsoradially.  The other segment was postitioned to exit dorsoulnarly.  The incision was closed  using interrupted 4-0 nylon placed loosely in a simple fashion.  A sterile dressing was applied.      Postoperative plan: The Penrose drain can be removed at postop day 1.  We will put in for an infectious disease consult.  Continue IV Vanco and ceftriaxone until further recommendations are provided by infectious disease consult.  Keep n.p.o. after midnight for possible return to the OR on 1/9/2025.

## 2025-01-07 NOTE — PROGRESS NOTES
Patient alert, oriented x 4. Complained of left thumb rating 7-10/10, aching throbbing, and constant in nature. Administered Oxycodone prn x 2 overnight, reported relief and was noted sleeping. Left thumb reddened and warm to touch. NPO since midnight. Will continue to monitor.

## 2025-01-08 ENCOUNTER — APPOINTMENT (OUTPATIENT)
Dept: ULTRASOUND IMAGING | Facility: HOSPITAL | Age: 60
DRG: 580 | End: 2025-01-08
Payer: COMMERCIAL

## 2025-01-08 ENCOUNTER — APPOINTMENT (OUTPATIENT)
Dept: OCCUPATIONAL THERAPY | Facility: HOSPITAL | Age: 60
DRG: 580 | End: 2025-01-08
Payer: COMMERCIAL

## 2025-01-08 LAB
ANION GAP SERPL CALCULATED.3IONS-SCNC: 10 MMOL/L (ref 7–15)
BUN SERPL-MCNC: 8 MG/DL (ref 8–23)
CALCIUM SERPL-MCNC: 9.3 MG/DL (ref 8.8–10.4)
CHLORIDE SERPL-SCNC: 103 MMOL/L (ref 98–107)
CREAT SERPL-MCNC: 0.59 MG/DL (ref 0.51–0.95)
EGFRCR SERPLBLD CKD-EPI 2021: >90 ML/MIN/1.73M2
ERYTHROCYTE [DISTWIDTH] IN BLOOD BY AUTOMATED COUNT: 12 % (ref 10–15)
GLUCOSE SERPL-MCNC: 86 MG/DL (ref 70–99)
HCO3 SERPL-SCNC: 27 MMOL/L (ref 22–29)
HCT VFR BLD AUTO: 34.8 % (ref 35–47)
HGB BLD-MCNC: 11.9 G/DL (ref 11.7–15.7)
MAGNESIUM SERPL-MCNC: 2 MG/DL (ref 1.7–2.3)
MCH RBC QN AUTO: 31.9 PG (ref 26.5–33)
MCHC RBC AUTO-ENTMCNC: 34.2 G/DL (ref 31.5–36.5)
MCV RBC AUTO: 93 FL (ref 78–100)
PHOSPHATE SERPL-MCNC: 4 MG/DL (ref 2.5–4.5)
PLATELET # BLD AUTO: 252 10E3/UL (ref 150–450)
POTASSIUM SERPL-SCNC: 3.8 MMOL/L (ref 3.4–5.3)
RBC # BLD AUTO: 3.73 10E6/UL (ref 3.8–5.2)
SODIUM SERPL-SCNC: 140 MMOL/L (ref 135–145)
VANCOMYCIN SERPL-MCNC: 4.3 UG/ML
WBC # BLD AUTO: 10 10E3/UL (ref 4–11)

## 2025-01-08 PROCEDURE — 83735 ASSAY OF MAGNESIUM: CPT | Performed by: FAMILY MEDICINE

## 2025-01-08 PROCEDURE — 85014 HEMATOCRIT: CPT

## 2025-01-08 PROCEDURE — 99222 1ST HOSP IP/OBS MODERATE 55: CPT | Performed by: INTERNAL MEDICINE

## 2025-01-08 PROCEDURE — 250N000013 HC RX MED GY IP 250 OP 250 PS 637

## 2025-01-08 PROCEDURE — 97165 OT EVAL LOW COMPLEX 30 MIN: CPT | Mod: GO

## 2025-01-08 PROCEDURE — 80202 ASSAY OF VANCOMYCIN: CPT | Performed by: FAMILY MEDICINE

## 2025-01-08 PROCEDURE — 84100 ASSAY OF PHOSPHORUS: CPT | Performed by: FAMILY MEDICINE

## 2025-01-08 PROCEDURE — 120N000001 HC R&B MED SURG/OB

## 2025-01-08 PROCEDURE — 93970 EXTREMITY STUDY: CPT

## 2025-01-08 PROCEDURE — 36415 COLL VENOUS BLD VENIPUNCTURE: CPT

## 2025-01-08 PROCEDURE — 99233 SBSQ HOSP IP/OBS HIGH 50: CPT | Mod: GC

## 2025-01-08 PROCEDURE — 250N000013 HC RX MED GY IP 250 OP 250 PS 637: Performed by: FAMILY MEDICINE

## 2025-01-08 PROCEDURE — 250N000011 HC RX IP 250 OP 636

## 2025-01-08 PROCEDURE — 97530 THERAPEUTIC ACTIVITIES: CPT | Mod: GO

## 2025-01-08 PROCEDURE — 250N000011 HC RX IP 250 OP 636: Performed by: FAMILY MEDICINE

## 2025-01-08 PROCEDURE — 80048 BASIC METABOLIC PNL TOTAL CA: CPT

## 2025-01-08 PROCEDURE — 250N000013 HC RX MED GY IP 250 OP 250 PS 637: Performed by: PHYSICIAN ASSISTANT

## 2025-01-08 PROCEDURE — 258N000003 HC RX IP 258 OP 636: Performed by: FAMILY MEDICINE

## 2025-01-08 RX ADMIN — ACYCLOVIR 400 MG: 400 TABLET ORAL at 08:15

## 2025-01-08 RX ADMIN — POLYETHYLENE GLYCOL 3350 17 G: 17 POWDER, FOR SOLUTION ORAL at 08:15

## 2025-01-08 RX ADMIN — ACETAMINOPHEN 975 MG: 325 TABLET ORAL at 14:58

## 2025-01-08 RX ADMIN — ACETAMINOPHEN 975 MG: 325 TABLET ORAL at 08:15

## 2025-01-08 RX ADMIN — OXYCODONE HYDROCHLORIDE 10 MG: 5 TABLET ORAL at 15:13

## 2025-01-08 RX ADMIN — OXYCODONE HYDROCHLORIDE 10 MG: 5 TABLET ORAL at 10:30

## 2025-01-08 RX ADMIN — OXYCODONE HYDROCHLORIDE 10 MG: 5 TABLET ORAL at 20:41

## 2025-01-08 RX ADMIN — TRAZODONE HYDROCHLORIDE 100 MG: 100 TABLET ORAL at 23:41

## 2025-01-08 RX ADMIN — SERTRALINE HYDROCHLORIDE 100 MG: 100 TABLET ORAL at 08:15

## 2025-01-08 RX ADMIN — SODIUM CHLORIDE 1250 MG: 9 INJECTION, SOLUTION INTRAVENOUS at 06:01

## 2025-01-08 RX ADMIN — SODIUM CHLORIDE 1750 MG: 9 INJECTION, SOLUTION INTRAVENOUS at 16:30

## 2025-01-08 RX ADMIN — ACETAMINOPHEN 975 MG: 325 TABLET ORAL at 20:25

## 2025-01-08 RX ADMIN — OXYCODONE HYDROCHLORIDE 5 MG: 5 TABLET ORAL at 06:04

## 2025-01-08 RX ADMIN — DIPHENHYDRAMINE HYDROCHLORIDE 25 MG: 25 CAPSULE ORAL at 19:19

## 2025-01-08 RX ADMIN — CEFTRIAXONE SODIUM 2 G: 2 INJECTION, POWDER, FOR SOLUTION INTRAMUSCULAR; INTRAVENOUS at 19:15

## 2025-01-08 RX ADMIN — ACETAMINOPHEN, ASPIRIN, CAFFEINE 1 TABLET: 250; 250; 65 TABLET, FILM COATED ORAL at 19:15

## 2025-01-08 RX ADMIN — PANTOPRAZOLE SODIUM 40 MG: 40 TABLET, DELAYED RELEASE ORAL at 07:11

## 2025-01-08 ASSESSMENT — ACTIVITIES OF DAILY LIVING (ADL)
ADLS_ACUITY_SCORE: 21
ADLS_ACUITY_SCORE: 21
ADLS_ACUITY_SCORE: 22
ADLS_ACUITY_SCORE: 21
ADLS_ACUITY_SCORE: 22
ADLS_ACUITY_SCORE: 21
ADLS_ACUITY_SCORE: 21
ADLS_ACUITY_SCORE: 22
ADLS_ACUITY_SCORE: 21
ADLS_ACUITY_SCORE: 21
ADLS_ACUITY_SCORE: 22
ADLS_ACUITY_SCORE: 21
ADLS_ACUITY_SCORE: 22
ADLS_ACUITY_SCORE: 21
ADLS_ACUITY_SCORE: 22
ADLS_ACUITY_SCORE: 22

## 2025-01-08 NOTE — PROGRESS NOTES
The patient complained of tenderness/pain on left arm. Redness and swelling noted. The patient also admitted the area causing itchiness. The provider was updated and assessed. An US ordered and ordered to give benadryl.  US completed and Benadryl given

## 2025-01-08 NOTE — PLAN OF CARE
Problem: Adult Inpatient Plan of Care  Goal: Plan of Care Review  Description: The Plan of Care Review/Shift note should be completed every shift.  The Outcome Evaluation is a brief statement about your assessment that the patient is improving, declining, or no change.  This information will be displayed automatically on your shift  note.  Outcome: Progressing     Problem: Adult Inpatient Plan of Care  Goal: Absence of Hospital-Acquired Illness or Injury  Outcome: Progressing     Problem: Infection  Goal: Absence of Infection Signs and Symptoms  Outcome: Progressing   Goal Outcome Evaluation:  Patient alert oriented x 4, able to verbalize needs, Independent in room, Penrose drains removed by surgery, NPO at midnight for surgery tomorrow, CMS improving, still some residual numbness and tingling in hand.

## 2025-01-08 NOTE — CONSULTS
"[facility] [place]  General ID Service Consult      Patient: Arlene Dominguez  YOB: 1965, MRN: [id number]  Date of Admission:  1/6/2025  Date of Consult: 01/08/2025  Consult Requested by: Piyush Garza MD  Admission Diagnosis: Cellulitis [L03.90]  Consult Question:     ID Assessment & Plan   Cellulitis and probable abscess of the left thumb/hand   Incision and drainage of left thumb/hand abscess and excisional debridement of the wound   Stain with GPC    PLAN  Got vanco and CFZ, now CTX  Can continue vanco/CTX pending cx  Then PO abx as Rx for SSTI    Reagan Porter M.D.      Reagan Porter MD  [facility] [place]  ______________________________________________________________________      History of Present Illness   Per ortho  The patient presents today with severe pain, swelling, and erythema over the volar aspect of the base of her thumb.  She notes that for years she has had a nontender cyst in this area that has not bothered her much, she assumed it was a ganglion cyst.  She has never had anything done for this cyst in the past.  She has never tried to incise the cyst on her own.  Over the last 2 days she developed progressively worsening swelling, erythema, and pain in this area.  She presented to the urgent care yesterday and she was directed to come to the emergency department where she was admitted for IV antibiotics.  She feels that the symptoms are worsening overnight even while on these IV antibiotics.  She denies fever/chills today.\"    No trauma  Describes  alump in that area for years she thought it was a cyst  He underwent the above procedure    Radiology personally reviewed  MRI  MPRESSION:  1.  Focal 2.5 cm nonenhancing area within the subcutaneous fat volar to the first MCP joint, with extensive adjacent subcutaneous edema and enhancement, suspicious for phlegmon and cellulitis. No discrete abscess.     2.  No tenosynovitis. No evidence of " osteomyelitis.    Review of Systems   The 10 point Review of Systems is negative other than noted in the HPI or here.     Past Medical History    History reviewed. No pertinent past medical history.    Past Surgical History   History reviewed. No pertinent surgical history.    Social History   Social History     Tobacco Use    Smoking status: Never    Smokeless tobacco: Never   Vaping Use    Vaping status: Never Used   Substance Use Topics    Alcohol use: Yes     Comment: 2-4 beers on friday and saturday    Drug use: Never       Family History     No significant family history    Medications   I have reviewed this patient's current medications    Allergies   No Known Allergies    Physical Exam   Vital Signs: Temp: 99.3  F (37.4  C) Temp src: Oral BP: 134/77 Pulse: 88   Resp: 20 SpO2: 94 % O2 Device: None (Room air) Oxygen Delivery: 1 LPM  Weight: 226 lbs 3.2 oz    Gen. appearance nontoxic  Eyes no conjunctivitis or icterus  Neck no stiffness   Heart  No edema  Lungs breathing comfortably  Abdomen soft not tender  Extremities no synovitis, trace edema  Hand wrapped  Skin  no rash or emboli  Neurologic alert oriented no focal deficits        Data   Inflammatory Markers   Recent Labs   Lab Test 01/07/25  0629 01/06/25  1530   CRPI 74.20* 37.30*        Hematology Studies   Recent Labs   Lab Test 01/08/25  0548 01/07/25  0629 01/06/25  1530   WBC 10.0 10.8 11.2*   HGB 11.9 13.8 13.8   MCV 93 94 93    272 326       Metabolic Studies   Recent Labs   Lab Test 01/08/25  0548 01/07/25  0629 01/06/25  1530    141 139   POTASSIUM 3.8 3.6 4.0   CHLORIDE 103 103 102   CO2 27 26 24   BUN 8.0 10.0 10.9   CR 0.59 0.87 0.73   GFRESTIMATED >90 76 >90       Hepatic Studies  No lab results found.    Most Recent 6 Bacteria Isolates From Any Culture (See EPIC Reports for Culture Details):No lab results found.    Urine Studies  No lab results found.    Vancomycin Levels    Recent Labs   Lab Test 01/08/25  0548   VANCOMYCIN 4.3  "      Hepatitis B Testing No lab results found.  Hepatitis C Testing   No results found for: \"HCVAB\", \"HQTG\", \"HCGENO\", \"HCPCR\", \"HQTRNA\", \"HEPRNA\"  HIVTesting No lab results found.    Respiratory Virus Testing    No results found for: \"RS\", \"FLUAG\"  COVID-19 Antibody Results, Testing for Immunity           No data to display              COVID-19 PCR Results           No data to display                "

## 2025-01-08 NOTE — PHARMACY-VANCOMYCIN DOSING SERVICE
Pharmacy Vancomycin Note  Date of Service 2025  Patient's  1965   59 year old, female    Indication: Bone and Joint Infection and Skin and Soft Tissue Infection  Day of Therapy: 2  Current vancomycin regimen:  1250 mg IV Q12H  Current vancomycin monitoring method: AUC  Current vancomycin therapeutic monitoring goal: 400-600 mg*h/L    InsightRX Prediction of Current Vancomycin Regimen  Regimen: 1250 mg IV every 12 hours.  Start time: 06:01 on 2025  Exposure target: AUC24 (range)400-600 mg/L.hr   AUC24,ss: 361 mg/L.hr  Probability of AUC24 > 400: 28 %  Ctrough,ss: 9.2 mg/L  Probability of Ctrough,ss > 20: 0 %  Probability of nephrotoxicity (Lodise GREGORY ): 5 %    Current estimated CrCl = Estimated Creatinine Clearance: 121.9 mL/min (based on SCr of 0.59 mg/dL).    Creatinine for last 3 days  2025:  3:30 PM Creatinine 0.73 mg/dL  2025:  6:29 AM Creatinine 0.87 mg/dL  2025:  5:48 AM Creatinine 0.59 mg/dL    Recent Vancomycin Levels (past 3 days)  2025:  5:48 AM Vancomycin 4.3 ug/mL    Vancomycin IV Administrations (past 72 hours)                     vancomycin (VANCOCIN) 1,250 mg in 0.9% NaCl 262.5 mL intermittent infusion (mg) 1,250 mg New Bag 25 0601     1,250 mg New Bag 25 0735    vancomycin (VANCOCIN) 2,000 mg in 0.9% NaCl 520 mL intermittent infusion (mg) 2,000 mg New Bag 25 1805                    Nephrotoxins and other renal medications (From now, onward)      Start     Dose/Rate Route Frequency Ordered Stop    25 0800  acyclovir (ZOVIRAX) tablet 400 mg        Note to Pharmacy: PTA Sig:Take 400 mg by mouth daily.      400 mg Oral DAILY 25 1916      25 0600  vancomycin (VANCOCIN) 1,250 mg in 0.9% NaCl 262.5 mL intermittent infusion         1,250 mg  over 90 Minutes Intravenous EVERY 12 HOURS 25 1920                 Contrast Orders - past 72 hours (72h ago, onward)      Start     Dose/Rate Route Frequency Stop    25 0030   gadobutrol (GADAVIST) injection 10 mL         10 mL Intravenous ONCE 01/07/25 0020            Interpretation of levels and current regimen:  Vancomycin level is reflective of AUC less than 400    Has serum creatinine changed greater than 50% in last 72 hours: No    Renal Function: Stable    InsightRX Prediction of Planned New Vancomycin Regimen  Regimen: 1750 mg IV every 12 hours.  Start time: 06:01 on 01/09/2025  Exposure target: AUC24 (range)400-600 mg/L.hr   AUC24,ss: 505 mg/L.hr  Probability of AUC24 > 400: 91 %  Ctrough,ss: 13.1 mg/L  Probability of Ctrough,ss > 20: 1 %  Probability of nephrotoxicity (Lodise GREGORY 2009): 8 %      Plan:  Increase Dose to 1750 mg Q12H  Vancomycin monitoring method: AUC  Vancomycin therapeutic monitoring goal: 400-600 mg*h/L  Pharmacy will check vancomycin levels as appropriate in 1-3 Days.  Serum creatinine levels will be ordered daily for the first week of therapy and at least twice weekly for subsequent weeks.    Mary Wiggins, [facility]

## 2025-01-08 NOTE — PROGRESS NOTES
01/08/25 0750   Appointment Info   Signing Clinician's Name / Credentials (OT) Loraine Gan, OTR/L   Living Environment   People in Home spouse   Current Living Arrangements condominium   Self-Care   Usual Activity Tolerance excellent   Current Activity Tolerance good   Equipment Currently Used at Home none   Fall history within last six months no   Activity/Exercise/Self-Care Comment IND for BADLs   Instrumental Activities of Daily Living (IADL)   IADL Comments IND for IADLs, currently on leave from work   General Information   Onset of Illness/Injury or Date of Surgery 01/06/25   Referring Physician Manisha Palumbo,    Patient/Family Therapy Goal Statement (OT) go home   Additional Occupational Profile Info/Pertinent History of Current Problem presents with L 1st digit pain, s/p I&D of left thumb/hand abscess and excisional debridement of the wound. potential for return to OR 1/9   Existing Precautions/Restrictions no known precautions/restrictions   General Observations and Info (S)  all A/PROM to LUE allowed per orders   Cognitive Status Examination   Orientation Status orientation to person, place and time   Affect/Mental Status (Cognitive) WFL   Follows Commands WFL   Sensory   Sensory Comments L upper arm remain numb, poor coordination but movements at shoulder and elbow   Pain Assessment   Patient Currently in Pain Yes, see Vital Sign flowsheet   Range of Motion Comprehensive   General Range of Motion bilateral upper extremity ROM WFL   Comment, General Range of Motion within restrictions of cast   Strength Comprehensive (MMT)   Comment, General Manual Muscle Testing (MMT) Assessment RUE WFL, LUE limited by nerve block and cast   Bed Mobility   Bed Mobility supine-sit   Supine-Sit [place] (Bed Mobility) independent   Comment (Bed Mobility) flat bed   Transfers   Transfers sit-stand transfer;toilet transfer   Sit-Stand Transfer   Sit-Stand [place] (Transfers) independent   Sit/Stand Transfer  Comments assist with IV pole   Toilet Transfer   Type (Toilet Transfer) sit-stand;stand-sit   [place] (Toilet Transfer) independent   Toilet Transfer Comments assist with IV pole   Balance   Balance Comments supervision for amb in room, pt pushing IV pole after writer setup   Activities of Daily Living   BADL Assessment/Intervention lower body dressing;toileting   Lower Body Dressing Assessment/Training   [place] (Lower Body Dressing) doff;don;socks;set up   Toileting   [place] (Toileting) supervision   Clinical Impression   Criteria for Skilled Therapeutic Interventions Met (OT) Yes, treatment indicated   OT Diagnosis dec functional use of LUE   OT Problem List-Impairments impacting ADL problems related to;range of motion (ROM);sensation;pain   Assessment of Occupational Performance 1-3 Performance Deficits   Identified Performance Deficits household management, meal prep   Planned Therapy Interventions (OT) home program guidelines;progressive activity/exercise;ROM   Clinical Decision Making Complexity (OT) problem focused assessment/low complexity   Risk & Benefits of therapy have been explained evaluation/treatment results reviewed;participants included;patient   OT Total Evaluation Time   OT Eval, Low Complexity Minutes (82770) 15   OT Goals   Therapy Frequency (OT) Daily   OT Predicted Duration/Target Date for Goal Attainment 01/15/25   OT Goals OT Goal 1   OT: Goal 1 Pt will complete LUE AROM HEP with handout IND to facilitate a return to IND during I/ADL routines.   Therapeutic Activities   Therapeutic Activity Minutes (35939) 9   Treatment Detail/Skilled Intervention Educ on importance to continue AROM within cast restrictions and according to pain tolerance. Educ on removing sling to complete light shoulder, elbow, and digit AROM throughout day once nerve block resolves. Pt demos ability to complete 3 reps of each - limited pt for ex today to avoid injury with nerve block  remaining.   OT Discharge Planning   OT Plan potential return to OR 1/9 but not confirmed: provide handout for LUE AROM HEP, review ADLs as needed but likely d/c after HEP issued   OT Discharge Recommendation (DC Rec) home with outpatient occupational therapy   OT Rationale for DC Rec lives with supportive spouse who can complete IADLs and assist PRN with bathing. anticipate pt to achieve PLOF once L cast removed. recommend outpatient OT to facilitate full return of L hand function.   OT Brief overview of current status Supervision/IND   Total Session Time   Timed Code Treatment Minutes 9   Total Session Time (sum of timed and untimed services) 24

## 2025-01-08 NOTE — PROGRESS NOTES
[facility]    Medicine Progress Note - Hospitalist Service    Date of Admission:  1/6/2025    Assessment & Plan   Arlene Dominguez is a 59 year old female admitted on 1/6/2025. She has a history of depression and insomnia and was admitted for evaluation of left first digit erythema/edema concerning for cellulitis vs. abscess. She is now post op day 1 from I&D with Orthopedics.     Left first digit MCP phlegmon    Left first digit abscess and cellulitis  Patient presented for 2 days of worsening pain, edema, and erythema on the volar aspect of the left first digit. She is now post-op day 1 from I&D with Orthopedic surgery for abscess and developing cellulitis of the left first MCP joint. Purulent drainage from I&D site was sent for culture. She remains afebrile and blood cultures have been without growth to date. WBC stable today at 10. BMP stable. Pain is manageable on current regimen.     -Ortho consult, recs appreciated    -Plan for additional washout tomorrow, 1/9, NPO at midnight.    -I&D cultures pending   -Continue vancomycin and ceftriaxone IV (1/6 - current)   -Dressing: Change TID for soapy water soaks, 20min each. Replace gauze dressing - non-adherent on incision, bulky gauze, ACE wrap   -LUE NWB. Keep elevated, above heart as much as possible    -Infectious Disease consult, recs appreciated     -Awaiting I&D cx   -Pain regimen:   -Tylenol 975mg PO TID   -Oxycodone to 5-10mg q4h PRN for moderate pain   -dilaudid 0.4mg IV q2h PRN    Depression  Insomnia   - Continue PTA sertraline and trazodone     Chronic conditions  - GERD: PTA omeprazole  - HSV: PTA acyclovir         Diet: Advance Diet as Tolerated: Regular Diet Adult  Discharge Instruction - Regular Diet Adult  NPO per Anesthesia Guidelines for Procedure/Surgery Except for: Meds    DVT Prophylaxis: VTE Prophylaxis contraindicated due to plan for procedure  Mckeon Catheter: Not present  Lines: None     Cardiac Monitoring:  "None  Code Status: Full Code      Clinically Significant Risk Factors                              # Obesity: Estimated body mass index is 37.64 kg/m  as calculated from the following:    Height as of this encounter: 1.651 m (5' 5\").    Weight as of this encounter: 102.6 kg (226 lb 3.2 oz)., PRESENT ON ADMISSION       # Financial/Environmental Concerns: none         Social Drivers of Health              Disposition Plan     Medically Ready for Discharge: Anticipated in 2-4 Days     The patient's care was discussed with the Attending Physician, Dr. Garza .    Edenilson Kirk, MS3    I was present with the medical student who participated in the service and in the documentation of this note. I have verified the history and personally performed the physical exam and medical decision making, and have verified the content of the note, which accurately reflects my assessment of the patient and the plan of care.     Luke Alba MD PGY2  [facility]  Securely message with [employer] (more info)  Text page via [employer] Paging/Directory   ______________________________________________________________________    Interval History   NAOE. Taken for I&D by Orthopedic Surgery yesterday (1/7). Has tolerated the procedure well. Has continues pain in left thumb, but reports it is manageable on current regimen. No chest pain, dyspnea, fevers, chills, abdominal pain, nausea or vomiting.    Physical Exam   Vital Signs: Temp: 99.3  F (37.4  C) Temp src: Oral BP: 134/77 Pulse: 88   Resp: 20 SpO2: 94 % O2 Device: None (Room air) Oxygen Delivery: 1 LPM  Weight: 226 lbs 3.2 oz    Physical Exam  Constitutional:       General: She is not in acute distress.     Appearance: Normal appearance.   HENT:      Head: Normocephalic and atraumatic.   Eyes:      General: No scleral icterus.  Pulmonary:      Effort: Pulmonary effort is normal.   Musculoskeletal:      Comments: L thumb - exam limited " due to casted extremity. Left digits 2-5 ROM are intact to flexion and extension. No extremity pallor.   Skin:     Comments: L 1st digit MCP not assessed due to casted extremity.   Neurological:      General: No focal deficit present.      Mental Status: She is alert and oriented to person, place, and time.       Medical Decision Making       ------------------ MEDICAL DECISION MAKING ------------------------------------------------------------------------------------------------------      Data   ------------------------- PAST 24 HR DATA REVIEWED -----------------------------------------------

## 2025-01-08 NOTE — PLAN OF CARE
The patient arrived to the floor at 19:00, the every 15min vitals were completed as ordered. The patient complained of a headache and was restless and uncomfortable, Excedrin given. SCD's applied. Sling to left arm in place. Hand has ace wrap in place. Dressing is clean, dry, and intact.     Goal Outcome Evaluation:         Problem: Infection  Goal: Absence of Infection Signs and Symptoms  Outcome: Progressing     Problem: Pain Acute  Goal: Optimal Pain Control and Function  Outcome: Progressing  Intervention: Develop Pain Management Plan  Recent Flowsheet Documentation  Taken 1/7/2025 2210 by Kassidy Lundy RN  Pain Management Interventions:   medication (see MAR)   emotional support   repositioned  Taken 1/7/2025 1900 by Kassidy Lundy RN  Pain Management Interventions:   cold applied   pillow support provided   repositioned  Intervention: Prevent or Manage Pain  Recent Flowsheet Documentation  Taken 1/7/2025 1900 by Kassidy Lundy, RN  Medication Review/Management: medications reviewed

## 2025-01-08 NOTE — PROGRESS NOTES
"Orthopedic Progress Note      Assessment: 1 Day Post-Op  S/P Procedure(s):  INCISION AND DRAINAGE, FINGER     Plan:   - Continue PT/OT.  Encourage gentle ROM of hand and fingers  - Weightbearing status: NWB LUE  - Activity: Up with assist and assistive device until independent.  - Anticoagulation: DVT chemoprophylaxis per primary, okay to start today if indicated.  SCDs, navin stockings and early ambulation.  - Antibiotics: Per ID, appreciate recs.  IV vanco and ceftriaxone for now  - Pain Management; continue current regimen  - Diet: progress diet as tolerated.  NPO at midnight tonight  - Labs: hgb 11.9, transfuse if <7.0. No indication today.  WBC 10.0  - Dressing: Remove dressing TID for warm soapy water soaks, 20 minutes each.  Replace with gauze dressing - non-adherent on the incision, bulky gauze, ACE wrap  - Elevation: Elevate LUE on pillow to keep above the level of the heart as much as possible  - Follow-up: Outpatient follow up in 2 weeks  - Disposition: Anticipate discharge home pending cultures and final antibiotic plan.  Stay at least until tomorrow, will assess in the morning if needs to go back to the OR      Subjective:  Pain: moderate  Nausea, Vomiting:  No  Lightheadedness, Dizziness:  No  Neuro:  Patient denies new onset numbness or paresthesias  Fever, chills: No  Chest pain: No  SOB: No    Patient reports feeling well today. Patient reports pain is tolerable with current pain regimen. Denies fevers/chills today.   All questions/concerns answered.      Objective:  /77   Pulse 88   Temp 99.3  F (37.4  C) (Oral)   Resp 20   Ht 1.651 m (5' 5\")   Wt 102.6 kg (226 lb 3.2 oz)   SpO2 94%   BMI 37.64 kg/m      The patient is A&Ox3. Appears comfortable, sitting up at bedside.  Sensation is intact to light touch to radial and ulnar side of all fingers  Radial pulse intact.  Incision is draining serosanguinous fluid.  No purulent drainage present.  Erythema and swelling significant but improved " "compared to preop  Penrose drains removed      Pertinent Labs   Lab Results: personally reviewed.   No results found for: \"INR\", \"PROTIME\"  Lab Results   Component Value Date    WBC 10.0 01/08/2025    HGB 11.9 01/08/2025    HCT 34.8 (L) 01/08/2025    MCV 93 01/08/2025     01/08/2025     Lab Results   Component Value Date     01/08/2025    CO2 27 01/08/2025       All cultures:  Recent Labs   Lab 01/07/25  1807 01/07/25  1805 01/06/25  1717 01/06/25  1707   CULTURE See corresponding culture for results See corresponding culture for results No growth after 1 day No growth after 1 day           Report completed by:  RAGHAVENDRA LEAL PA-C  Date: 01/08/2025    [facility]              "

## 2025-01-08 NOTE — PLAN OF CARE
Problem: Pain Acute  Goal: Optimal Pain Control and Function  Outcome: Progressing   Goal Outcome Evaluation:       Patient states that she does not have any pain at this time. Patient had block during surgery and adds that she does still have some numbness. Patient able to wiggle fingers and feel me touching them. Fingers are warm.

## 2025-01-08 NOTE — ANESTHESIA POSTPROCEDURE EVALUATION
Patient: Arlene Dominguez    Procedure: Procedure(s):  INCISION AND DRAINAGE, FINGER       Anesthesia Type:  No value filed.    Note:  Disposition: Inpatient   Postop Pain Control:             Sign Out: Well controlled pain   PONV: No   Neuro/Psych:             Sign Out: Acceptable/Baseline neuro status   Airway/Respiratory:             Sign Out: Acceptable/Baseline resp. status   CV/Hemodynamics:             Sign Out: Acceptable CV status   Other NRE:    DID A NON-ROUTINE EVENT OCCUR? No           Last vitals:  Vitals:    01/07/25 1540 01/07/25 1600 01/07/25 1630   BP:      Pulse: 82 85 84   Resp: 16 15 15   Temp: 37.3  C (99.1  F) 37.2  C (99  F) 37.2  C (99  F)   SpO2: 94%         Electronically Signed By: Tae Martinez MD  January 7, 2025  6:53 PM

## 2025-01-08 NOTE — PROGRESS NOTES
4:44 PM    Notified by RN that patient developed extremity swelling and red streaks up her arm.    Vitally stable on evaluation.  She has 2 large streaks from her hand up to her mid upper arm.  Radial pulse palpable in LUE.  Wiggling fingers and extension/flexion at elbow WNL.  She reports some itchiness along the welts on her arms. Mild tenderness to palpation. No si/sx of ischemic limb. Does not have any IVs in this arm.     Actively allergic reaction to soapy water soaks as this started afterwards.  Will rule out DVT with BUE U/S. Patient has PRN benadryl, which we will give. Continue to monitor.     Luke Alba MD PGY2  [facility]

## 2025-01-08 NOTE — ANESTHESIA CARE TRANSFER NOTE
Patient: Arlene Dominguez    Procedure: Procedure(s):  INCISION AND DRAINAGE, FINGER       Diagnosis: Abscess of left thumb [L02.512]  Diagnosis Additional Information: No value filed.    Anesthesia Type:   No value filed.     Note:    Oropharynx: oropharynx clear of all foreign objects  Level of Consciousness: awake  Oxygen Supplementation: nasal cannula  Level of Supplemental Oxygen (L/min / FiO2): 3  Independent Airway: airway patency satisfactory and stable  Dentition: dentition unchanged  Vital Signs Stable: post-procedure vital signs reviewed and stable  Report to RN Given: handoff report given  Patient transferred to: Medical/Surgical Unit    Handoff Report: Identifed the Patient, Identified the Reponsible Provider, Reviewed the pertinent medical history, Discussed the surgical course, Reviewed Intra-OP anesthesia mangement and issues during anesthesia, Set expectations for post-procedure period and Allowed opportunity for questions and acknowledgement of understanding      Vitals:  Vitals Value Taken Time   /68    Temp     Pulse 76    Resp 16    SpO2 95        Electronically Signed By: GLORIA Way CRNA  January 7, 2025  6:36 PM   Pre-Operative Diagnosis: Brain metastases ([facility] [address] [C79.31]     Post-Operative Diagnosis: Brain metastases ([facility] [address] [C79.31]      Procedure Performed:   SUBOCCIPITAL CRANIOTOMY FOR CEREBELLAR TUMOR,  MICROSCOPE DISSECTION, NEURO MONITORING      Surgeon(s) and Ro

## 2025-01-09 ENCOUNTER — ANESTHESIA EVENT (OUTPATIENT)
Dept: SURGERY | Facility: HOSPITAL | Age: 60
End: 2025-01-09
Payer: COMMERCIAL

## 2025-01-09 ENCOUNTER — ANESTHESIA (OUTPATIENT)
Dept: SURGERY | Facility: HOSPITAL | Age: 60
End: 2025-01-09
Payer: COMMERCIAL

## 2025-01-09 ENCOUNTER — APPOINTMENT (OUTPATIENT)
Dept: OCCUPATIONAL THERAPY | Facility: HOSPITAL | Age: 60
DRG: 580 | End: 2025-01-09
Payer: COMMERCIAL

## 2025-01-09 VITALS
HEART RATE: 87 BPM | RESPIRATION RATE: 16 BRPM | TEMPERATURE: 98.4 F | DIASTOLIC BLOOD PRESSURE: 69 MMHG | OXYGEN SATURATION: 96 % | SYSTOLIC BLOOD PRESSURE: 124 MMHG | BODY MASS INDEX: 37.69 KG/M2 | HEIGHT: 65 IN | WEIGHT: 226.2 LBS

## 2025-01-09 LAB
ANION GAP SERPL CALCULATED.3IONS-SCNC: 11 MMOL/L (ref 7–15)
BACTERIA BLD CULT: NORMAL
BACTERIA BLD CULT: NORMAL
BACTERIA SPEC CULT: ABNORMAL
BACTERIA SPEC CULT: ABNORMAL
BACTERIA SPEC CULT: NORMAL
BUN SERPL-MCNC: 9.7 MG/DL (ref 8–23)
CALCIUM SERPL-MCNC: 9.3 MG/DL (ref 8.8–10.4)
CHLORIDE SERPL-SCNC: 104 MMOL/L (ref 98–107)
CREAT SERPL-MCNC: 0.65 MG/DL (ref 0.51–0.95)
CRP SERPL-MCNC: 80 MG/L
EGFRCR SERPLBLD CKD-EPI 2021: >90 ML/MIN/1.73M2
ERYTHROCYTE [DISTWIDTH] IN BLOOD BY AUTOMATED COUNT: 12 % (ref 10–15)
GLUCOSE BLDC GLUCOMTR-MCNC: 86 MG/DL (ref 70–99)
GLUCOSE SERPL-MCNC: 84 MG/DL (ref 70–99)
GRAM STAIN RESULT: NORMAL
HCO3 SERPL-SCNC: 24 MMOL/L (ref 22–29)
HCT VFR BLD AUTO: 34.9 % (ref 35–47)
HGB BLD-MCNC: 11.8 G/DL (ref 11.7–15.7)
MCH RBC QN AUTO: 31.9 PG (ref 26.5–33)
MCHC RBC AUTO-ENTMCNC: 33.8 G/DL (ref 31.5–36.5)
MCV RBC AUTO: 94 FL (ref 78–100)
PLATELET # BLD AUTO: 260 10E3/UL (ref 150–450)
POTASSIUM SERPL-SCNC: 4 MMOL/L (ref 3.4–5.3)
RBC # BLD AUTO: 3.7 10E6/UL (ref 3.8–5.2)
SODIUM SERPL-SCNC: 139 MMOL/L (ref 135–145)
WBC # BLD AUTO: 6.3 10E3/UL (ref 4–11)

## 2025-01-09 PROCEDURE — 250N000011 HC RX IP 250 OP 636: Performed by: NURSE ANESTHETIST, CERTIFIED REGISTERED

## 2025-01-09 PROCEDURE — 0KBD0ZZ EXCISION OF LEFT HAND MUSCLE, OPEN APPROACH: ICD-10-PCS | Performed by: STUDENT IN AN ORGANIZED HEALTH CARE EDUCATION/TRAINING PROGRAM

## 2025-01-09 PROCEDURE — 258N000001 HC RX 258: Performed by: STUDENT IN AN ORGANIZED HEALTH CARE EDUCATION/TRAINING PROGRAM

## 2025-01-09 PROCEDURE — 250N000011 HC RX IP 250 OP 636

## 2025-01-09 PROCEDURE — 85018 HEMOGLOBIN: CPT

## 2025-01-09 PROCEDURE — 36415 COLL VENOUS BLD VENIPUNCTURE: CPT

## 2025-01-09 PROCEDURE — 250N000009 HC RX 250: Performed by: NURSE ANESTHETIST, CERTIFIED REGISTERED

## 2025-01-09 PROCEDURE — 258N000003 HC RX IP 258 OP 636: Performed by: FAMILY MEDICINE

## 2025-01-09 PROCEDURE — 97110 THERAPEUTIC EXERCISES: CPT | Mod: GO

## 2025-01-09 PROCEDURE — 258N000003 HC RX IP 258 OP 636: Performed by: STUDENT IN AN ORGANIZED HEALTH CARE EDUCATION/TRAINING PROGRAM

## 2025-01-09 PROCEDURE — 250N000013 HC RX MED GY IP 250 OP 250 PS 637: Performed by: FAMILY MEDICINE

## 2025-01-09 PROCEDURE — 99233 SBSQ HOSP IP/OBS HIGH 50: CPT | Mod: GC

## 2025-01-09 PROCEDURE — 250N000011 HC RX IP 250 OP 636: Performed by: STUDENT IN AN ORGANIZED HEALTH CARE EDUCATION/TRAINING PROGRAM

## 2025-01-09 PROCEDURE — 87205 SMEAR GRAM STAIN: CPT | Performed by: STUDENT IN AN ORGANIZED HEALTH CARE EDUCATION/TRAINING PROGRAM

## 2025-01-09 PROCEDURE — 370N000017 HC ANESTHESIA TECHNICAL FEE, PER MIN: Performed by: STUDENT IN AN ORGANIZED HEALTH CARE EDUCATION/TRAINING PROGRAM

## 2025-01-09 PROCEDURE — 250N000011 HC RX IP 250 OP 636: Performed by: FAMILY MEDICINE

## 2025-01-09 PROCEDURE — 87102 FUNGUS ISOLATION CULTURE: CPT | Performed by: STUDENT IN AN ORGANIZED HEALTH CARE EDUCATION/TRAINING PROGRAM

## 2025-01-09 PROCEDURE — 84132 ASSAY OF SERUM POTASSIUM: CPT

## 2025-01-09 PROCEDURE — 120N000001 HC R&B MED SURG/OB

## 2025-01-09 PROCEDURE — 999N000141 HC STATISTIC PRE-PROCEDURE NURSING ASSESSMENT: Performed by: STUDENT IN AN ORGANIZED HEALTH CARE EDUCATION/TRAINING PROGRAM

## 2025-01-09 PROCEDURE — 360N000075 HC SURGERY LEVEL 2, PER MIN: Performed by: STUDENT IN AN ORGANIZED HEALTH CARE EDUCATION/TRAINING PROGRAM

## 2025-01-09 PROCEDURE — 87075 CULTR BACTERIA EXCEPT BLOOD: CPT | Performed by: STUDENT IN AN ORGANIZED HEALTH CARE EDUCATION/TRAINING PROGRAM

## 2025-01-09 PROCEDURE — 80048 BASIC METABOLIC PNL TOTAL CA: CPT

## 2025-01-09 PROCEDURE — 250N000013 HC RX MED GY IP 250 OP 250 PS 637

## 2025-01-09 PROCEDURE — 999N000248 HC STATISTIC IV INSERT WITH US BY RN

## 2025-01-09 PROCEDURE — 258N000003 HC RX IP 258 OP 636: Performed by: NURSE ANESTHETIST, CERTIFIED REGISTERED

## 2025-01-09 PROCEDURE — 87070 CULTURE OTHR SPECIMN AEROBIC: CPT | Performed by: STUDENT IN AN ORGANIZED HEALTH CARE EDUCATION/TRAINING PROGRAM

## 2025-01-09 PROCEDURE — 86140 C-REACTIVE PROTEIN: CPT | Performed by: PHYSICIAN ASSISTANT

## 2025-01-09 PROCEDURE — 272N000001 HC OR GENERAL SUPPLY STERILE: Performed by: STUDENT IN AN ORGANIZED HEALTH CARE EDUCATION/TRAINING PROGRAM

## 2025-01-09 RX ORDER — LIDOCAINE 40 MG/G
CREAM TOPICAL
Status: DISCONTINUED | OUTPATIENT
Start: 2025-01-09 | End: 2025-01-09 | Stop reason: HOSPADM

## 2025-01-09 RX ORDER — NALOXONE HYDROCHLORIDE 0.4 MG/ML
0.1 INJECTION, SOLUTION INTRAMUSCULAR; INTRAVENOUS; SUBCUTANEOUS
Status: DISCONTINUED | OUTPATIENT
Start: 2025-01-09 | End: 2025-01-09 | Stop reason: HOSPADM

## 2025-01-09 RX ORDER — FLUMAZENIL 0.1 MG/ML
0.2 INJECTION, SOLUTION INTRAVENOUS
Status: DISCONTINUED | OUTPATIENT
Start: 2025-01-09 | End: 2025-01-09 | Stop reason: HOSPADM

## 2025-01-09 RX ORDER — PROPOFOL 10 MG/ML
INJECTION, EMULSION INTRAVENOUS CONTINUOUS PRN
Status: DISCONTINUED | OUTPATIENT
Start: 2025-01-09 | End: 2025-01-09

## 2025-01-09 RX ORDER — CEFEPIME HYDROCHLORIDE 2 G/1
2 INJECTION, POWDER, FOR SOLUTION INTRAVENOUS EVERY 12 HOURS
Status: DISCONTINUED | OUTPATIENT
Start: 2025-01-09 | End: 2025-01-12

## 2025-01-09 RX ORDER — SODIUM CHLORIDE, SODIUM LACTATE, POTASSIUM CHLORIDE, CALCIUM CHLORIDE 600; 310; 30; 20 MG/100ML; MG/100ML; MG/100ML; MG/100ML
INJECTION, SOLUTION INTRAVENOUS CONTINUOUS
Status: DISCONTINUED | OUTPATIENT
Start: 2025-01-09 | End: 2025-01-09 | Stop reason: HOSPADM

## 2025-01-09 RX ORDER — CEFEPIME HYDROCHLORIDE 2 G/1
2 INJECTION, POWDER, FOR SOLUTION INTRAVENOUS EVERY 12 HOURS
Status: DISCONTINUED | OUTPATIENT
Start: 2025-01-09 | End: 2025-01-09 | Stop reason: HOSPADM

## 2025-01-09 RX ORDER — LIDOCAINE HYDROCHLORIDE 10 MG/ML
INJECTION, SOLUTION INFILTRATION; PERINEURAL PRN
Status: DISCONTINUED | OUTPATIENT
Start: 2025-01-09 | End: 2025-01-09

## 2025-01-09 RX ORDER — BUPIVACAINE HYDROCHLORIDE 5 MG/ML
INJECTION, SOLUTION EPIDURAL; INTRACAUDAL
Status: COMPLETED | OUTPATIENT
Start: 2025-01-09 | End: 2025-01-09

## 2025-01-09 RX ORDER — GLYCOPYRROLATE 0.2 MG/ML
INJECTION, SOLUTION INTRAMUSCULAR; INTRAVENOUS PRN
Status: DISCONTINUED | OUTPATIENT
Start: 2025-01-09 | End: 2025-01-09

## 2025-01-09 RX ADMIN — SODIUM CHLORIDE 1750 MG: 9 INJECTION, SOLUTION INTRAVENOUS at 20:30

## 2025-01-09 RX ADMIN — SODIUM CHLORIDE, POTASSIUM CHLORIDE, SODIUM LACTATE AND CALCIUM CHLORIDE: 600; 310; 30; 20 INJECTION, SOLUTION INTRAVENOUS at 16:24

## 2025-01-09 RX ADMIN — OXYCODONE HYDROCHLORIDE 10 MG: 5 TABLET ORAL at 14:16

## 2025-01-09 RX ADMIN — HYDROMORPHONE HYDROCHLORIDE 0.4 MG: 0.2 INJECTION, SOLUTION INTRAMUSCULAR; INTRAVENOUS; SUBCUTANEOUS at 00:01

## 2025-01-09 RX ADMIN — DEXMEDETOMIDINE HYDROCHLORIDE 10 MCG: 100 INJECTION, SOLUTION INTRAVENOUS at 16:33

## 2025-01-09 RX ADMIN — SERTRALINE HYDROCHLORIDE 100 MG: 100 TABLET ORAL at 09:51

## 2025-01-09 RX ADMIN — PANTOPRAZOLE SODIUM 40 MG: 40 TABLET, DELAYED RELEASE ORAL at 09:54

## 2025-01-09 RX ADMIN — SODIUM CHLORIDE 1750 MG: 9 INJECTION, SOLUTION INTRAVENOUS at 06:09

## 2025-01-09 RX ADMIN — TRAZODONE HYDROCHLORIDE 100 MG: 100 TABLET ORAL at 21:09

## 2025-01-09 RX ADMIN — CEFEPIME HYDROCHLORIDE 2 G: 2 INJECTION, POWDER, FOR SOLUTION INTRAVENOUS at 20:29

## 2025-01-09 RX ADMIN — OXYCODONE HYDROCHLORIDE 10 MG: 5 TABLET ORAL at 09:50

## 2025-01-09 RX ADMIN — GLYCOPYRROLATE 0.2 MG: 0.2 INJECTION INTRAMUSCULAR; INTRAVENOUS at 16:33

## 2025-01-09 RX ADMIN — PROPOFOL 150 MCG/KG/MIN: 10 INJECTION, EMULSION INTRAVENOUS at 16:31

## 2025-01-09 RX ADMIN — ACYCLOVIR 400 MG: 400 TABLET ORAL at 09:52

## 2025-01-09 RX ADMIN — BUPIVACAINE HYDROCHLORIDE 25 ML: 5 INJECTION, SOLUTION EPIDURAL; INTRACAUDAL; PERINEURAL at 16:05

## 2025-01-09 RX ADMIN — LIDOCAINE HYDROCHLORIDE 2 ML: 10 INJECTION, SOLUTION INFILTRATION; PERINEURAL at 16:31

## 2025-01-09 RX ADMIN — ACETAMINOPHEN 975 MG: 325 TABLET ORAL at 21:09

## 2025-01-09 RX ADMIN — MIDAZOLAM HYDROCHLORIDE 2 MG: 1 INJECTION, SOLUTION INTRAMUSCULAR; INTRAVENOUS at 16:00

## 2025-01-09 RX ADMIN — ACETAMINOPHEN, ASPIRIN, CAFFEINE 1 TABLET: 250; 250; 65 TABLET, FILM COATED ORAL at 10:25

## 2025-01-09 ASSESSMENT — ACTIVITIES OF DAILY LIVING (ADL)
ADLS_ACUITY_SCORE: 21
ADLS_ACUITY_SCORE: 21
ADLS_ACUITY_SCORE: 22
ADLS_ACUITY_SCORE: 21
ADLS_ACUITY_SCORE: 22
ADLS_ACUITY_SCORE: 21
ADLS_ACUITY_SCORE: 22
ADLS_ACUITY_SCORE: 22
ADLS_ACUITY_SCORE: 21
ADLS_ACUITY_SCORE: 21
ADLS_ACUITY_SCORE: 22
ADLS_ACUITY_SCORE: 21
ADLS_ACUITY_SCORE: 22
ADLS_ACUITY_SCORE: 21

## 2025-01-09 ASSESSMENT — ENCOUNTER SYMPTOMS: SEIZURES: 0

## 2025-01-09 NOTE — OP NOTE
Date of Procedure: 01/09/25     Surgeon: Xochilt Holt MD     Assistant: Hailee Marion PA-C. DENNISE assist required for patient positioning, soft tissue retraction, instrumentation assist, and patient's safety.    Preoperative diagnosis: Left hand abcess    Postoperative diagnosis: as above     Operation/procedures performed: Incision and drainage of left thumb/hand abscess and excisional debridement of the wound    Sedation/anesthesia: Left regional block supplemented with MAC sedation    Complications: None    Drains:  Iodoform packing strip, 6 inches    Estimated blood loss: 5 cc    Implants: None    Specimens: Two culture swabs were sent to microbiology for Gram stain, aerobic and anaerobic culture.    Indications for Surgery:The patient is a 59-year-old female who has had a small nontender cyst over the volar aspect of her left thumb MP joint for several years.  This cyst ultimately developed into an abscess.  She underwent irrigation and debridement by my partner Dr. Gomez on 1/7/2025.  Cultures were positive for Pseudomonas.  Patient was placed on broad-spectrum antibiotics but unfortunately continued to have purulent drainage.  She is therefore indicated for repeat irrigation and debridement. The risks, benefits, and alternatives of this surgical procedure were thoroughly discussed with the patient.  I outlined the risks of surgery which included, but are not limited to: Infection, recurrence, incisional pain, the development of scar tissue, and/or neurovascular injury. The consequences of such risks could include hospital admission, additional surgery, chronic pain, loss of strength, loss of sensation, loss of motion and/or loss of function. I explained that although these risks are low they are real. The patient then had opportunity to ask questions which I answered. After thorough discussion, the patient verbalized understanding and stated that they wished proceed with operative  "intervention.    Tourniquet time: See nursing notes     Disposition: Recovery room in good condition    Operation description: Patient was identified, informed consent was obtained, and the surgical site was marked in the holding area.  The anesthesiologist administered a left regional block.  The patient was then brought into the operating room and positioned supine with the left upper extremity positioned on an arm board.  The anesthetist then administered MAC sedation.    The patient's left upper extremity was then prepped and draped in the standard surgical fashion.  This included placing a well-padded tourniquet on the upper arm.  Prior to beginning the case a \"timeout\" was performed by the surgical team to confirm surgical site, side, and procedure.  The patient's left upper extremity was then elevated and the tourniquet was inflated to 250 mmHg.    The patient's Silvino incision was utilized and extended both proximally and distally.  There was mild purulence that was immediately encountered. This was swabbed and sent as a specimen to microbiology for Gram stain, aerobic culture and anaerobic culture x 2.      There was no abscess that was encountered. The tissue did appear to be quite friable.  I then sharply debrided some necrotic tissue from the wound using a 15 blade and tenotomy scissors.  I sharply excised all the necrotic tissue (which was mostly necrotic fat and muscle) from the wound.   Furthermore, a small incision was made dorsally but there was no pus encountered. The wound was then copiously irrigated with 3L of NS. Following irrigation, there was no gross purulence noted.  A 6 inch piece of packing was placed in the wound and the incision was closed using interrupted 4-0 nylon placed loosely in a simple fashion.  A sterile dressing was applied.      Postoperative plan: Continue to follow cultures. Packing strip to come out POD2.     Xochilt Holt MD   5:24 PM   01/09/25     "

## 2025-01-09 NOTE — PLAN OF CARE
Problem: Skin or Soft Tissue Infection  Goal: Absence of Infection Signs and Symptoms  Intervention: Minimize and Manage Infection Progression  Recent Flowsheet Documentation  Taken 1/9/2025 0350 by Cookie Roberts RN  Infection Prevention: cohorting utilized     Problem: Wound  Goal: Optimal Functional Ability  Intervention: Optimize Functional Ability  Recent Flowsheet Documentation  Taken 1/9/2025 0341 by Cookie Roberts RN  Activity Management: activity adjusted per tolerance  Activity Assistance Provided: independent     Problem: Comorbidity Management  Goal: Maintenance of Behavioral Health Symptom Control  Intervention: Maintain Behavioral Health Symptom Control  Recent Flowsheet Documentation  Taken 1/9/2025 0341 by Cookie Roberts RN  Medication Review/Management: medications reviewed   Goal Outcome Evaluation:      Plan of Care Reviewed With: patient    Overall Patient Progress: improvingOverall Patient Progress: improving     Pt A&Ox4. VSS. On room air. Dilaudid given for pain. The hand dressing clean, dry, intact. The pt's IV infiltrated. SWAT nurse placed another IV at 0327, that also infiltrated in the morning. Has stopped Vanco, merlyd PICC. Waiting for their response.

## 2025-01-09 NOTE — PLAN OF CARE
"The dressing was removed to do ordred soaking and dressing change. The wound and surrounding skin was more swollen and red. Moderate drainage noted. The wound was macerated and white. Denies numbness, admitted slight tingling, pulse normal. Cap refill less than 3sec. Movement of fingers unchanged. The provider and [facility] was updated. This nurse questioned the soaking as the skin is severly macerated. Dr. Gonzalez stated it was okay not to soak the left hand this evening. The house officer provider assessed the hand and arm, took pictures. Dressing applied. Xeroform, dry gauze, roll gauze and acewrap. Elevating left hand on pillows. Ice pack applied. Tylenol and Oxycodone given for pain. /62 (BP Location: Left arm)   Pulse 77   Temp 98.3  F (36.8  C) (Oral)   Resp 18   Ht 1.651 m (5' 5\")   Wt 102.6 kg (226 lb 3.2 oz)   SpO2 92%   BMI 37.64 kg/m    The patient will be NPO at midnight. IV antibiotics infused as ordered.     Goal Outcome Evaluation:    Problem: Wound  Goal: Optimal Coping  Outcome: Progressing          Problem: Adult Inpatient Plan of Care  Goal: Absence of Hospital-Acquired Illness or Injury  Intervention: Identify and Manage Fall Risk  Recent Flowsheet Documentation  Taken 1/8/2025 1605 by Kassidy Lundy RN  Safety Promotion/Fall Prevention:   patient and family education   nonskid shoes/slippers when out of bed  Intervention: Prevent Skin Injury  Recent Flowsheet Documentation  Taken 1/8/2025 1700 by Kassidy Lundy RN  Body Position: position changed independently  Taken 1/8/2025 1605 by Kassidy Lundy RN  Body Position: position changed independently  Intervention: Prevent and Manage VTE (Venous Thromboembolism) Risk  Recent Flowsheet Documentation  Taken 1/8/2025 1605 by Kassidy Lundy RN  VTE Prevention/Management: (patient refused, up walking) SCDs off (sequential compression devices)  Intervention: Prevent Infection  Recent Flowsheet Documentation  Taken 1/8/2025 1726 by " Kassidy Lundy RN  Infection Prevention: hand hygiene promoted  Goal: Optimal Comfort and Wellbeing  Intervention: Monitor Pain and Promote Comfort  Recent Flowsheet Documentation  Taken 1/8/2025 2025 by Kassidy Lundy RN  Pain Management Interventions: (elevation)   medication (see MAR)   pillow support provided  Taken 1/8/2025 1915 by Kassidy Lundy RN  Pain Management Interventions: (elevation)   medication (see MAR)   pillow support provided   repositioned   shower  Taken 1/8/2025 1731 by Kassidy Lundy RN  Pain Management Interventions:   medication (see MAR)   cold applied  Taken 1/8/2025 1604 by Kassidy Lundy RN  Pain Management Interventions:   cold applied   pillow support provided   repositioned   emotional support     Problem: Pain Acute  Goal: Optimal Pain Control and Function  Intervention: Develop Pain Management Plan  Recent Flowsheet Documentation  Taken 1/8/2025 2025 by aKssidy Lundy RN  Pain Management Interventions: (elevation)   medication (see MAR)   pillow support provided  Taken 1/8/2025 1915 by Kassidy Lundy RN  Pain Management Interventions: (elevation)   medication (see MAR)   pillow support provided   repositioned   shower  Taken 1/8/2025 1731 by Kassidy Lundy RN  Pain Management Interventions:   medication (see MAR)   cold applied  Taken 1/8/2025 1604 by Kassidy Lundy RN  Pain Management Interventions:   cold applied   pillow support provided   repositioned   emotional support     Problem: Skin or Soft Tissue Infection  Goal: Absence of Infection Signs and Symptoms  Intervention: Minimize and Manage Infection Progression  Recent Flowsheet Documentation  Taken 1/8/2025 1726 by Kassidy Lundy RN  Infection Prevention: hand hygiene promoted     Problem: Wound  Goal: Optimal Functional Ability  Intervention: Optimize Functional Ability  Recent Flowsheet Documentation  Taken 1/8/2025 1605 by Kassidy Lundy RN  Activity Management: activity adjusted per tolerance  Activity  Assistance Provided: independent  Goal: Optimal Pain Control and Function  Intervention: Prevent or Manage Pain  Recent Flowsheet Documentation  Taken 1/8/2025 2025 by Kassidy Lundy RN  Pain Management Interventions: (elevation)   medication (see MAR)   pillow support provided  Taken 1/8/2025 1915 by Kassidy Lundy RN  Pain Management Interventions: (elevation)   medication (see MAR)   pillow support provided   repositioned   shower  Taken 1/8/2025 1731 by Kassidy Lundy RN  Pain Management Interventions:   medication (see MAR)   cold applied  Taken 1/8/2025 1604 by Kassidy Lundy RN  Pain Management Interventions:   cold applied   pillow support provided   repositioned   emotional support  Goal: Skin Health and Integrity  Intervention: Optimize Skin Protection  Recent Flowsheet Documentation  Taken 1/8/2025 1605 by Kassidy Lundy RN  Activity Management: activity adjusted per tolerance  Head of Bed (HOB) Positioning: HOB at 20-30 degrees

## 2025-01-09 NOTE — ADDENDUM NOTE
Addendum  created 01/09/25 1509 by Tae Martinez MD    Clinical Note Signed, Diagnosis association updated, Intraprocedure Blocks edited, SmartForm saved

## 2025-01-09 NOTE — ANESTHESIA POSTPROCEDURE EVALUATION
Patient: Arlene Dominguez    Procedure: Procedure(s):  INCISION AND DRAINAGE, HAND, LEFT THUMB       Anesthesia Type:  MAC    Note:  Disposition: Inpatient   Postop Pain Control: Uneventful            Sign Out: Well controlled pain   PONV: No   Neuro/Psych: Uneventful            Sign Out: Acceptable/Baseline neuro status   Airway/Respiratory: Uneventful            Sign Out: Acceptable/Baseline resp. status   CV/Hemodynamics: Uneventful            Sign Out: Acceptable CV status; No obvious hypovolemia; No obvious fluid overload   Other NRE: NONE   DID A NON-ROUTINE EVENT OCCUR? No           Last vitals:  Vitals:    01/09/25 1605 01/09/25 1610 01/09/25 1615   BP: 118/74 106/68 105/67   Pulse: 71 75 77   Resp: 8 12 26   Temp:      SpO2: 96% 95% 96%       Electronically Signed By: Ramon Dinh MD  January 9, 2025  5:36 PM

## 2025-01-09 NOTE — ANESTHESIA PROCEDURE NOTES
Brachial plexus Procedure Note    Pre-Procedure   Staff -        Anesthesiologist:  Ramon Dinh MD       Performed By: anesthesiologist       Location: pre-op       Procedure Start/Stop Times: 1/9/2025 4:05 PM       Pre-Anesthestic Checklist: patient identified, IV checked, site marked, risks and benefits discussed, informed consent, monitors and equipment checked, pre-op evaluation, at physician/surgeon's request and post-op pain management  Timeout:       Correct Patient: Yes        Correct Procedure: Yes        Correct Site: Yes        Correct Position: Yes        Correct Laterality: Yes        Site Marked: Yes  Procedure Documentation  Procedure: Brachial plexus         Laterality: left       Skin prep: Chloraprep (supraclavicular approach).       Needle Type: short bevel       Needle Gauge: 20.        Needle Length (Inches): 4        Ultrasound guided       1. Ultrasound was used to identify targeted nerve, plexus, vascular marker, or fascial plane and place a needle adjacent to it in real-time.       2. Ultrasound was used to visualize the spread of anesthetic in close proximity to the above referenced structure.       3. A permanent image is entered into the patient's record.       4. The visualized anatomic structures appeared normal.       5. There were no apparent abnormal pathologic findings.    Assessment/Narrative         The placement was negative for: blood aspirated, painful injection and site bleeding       Paresthesias: Resolved (transient. Redirected needle and they immediately subsided).       Bolus given via needle..        Secured via.        Insertion/Infusion Method: Single Shot       Complications: none       Injection made incrementally with aspirations every 5 mL.    Medication(s) Administered   Bupivacaine 0.5% PF (Infiltration) - Infiltration   25 mL - 1/9/2025 4:05:00 PM  Medication Administration Time: 1/9/2025 4:05 PM      FOR [facility] ([facility] ONLY:   Pain Team Contact  "information: please page the Pain Team Via [facility]. Search \"Pain\". During daytime hours, please page the attending first. At night please page the resident first.      "

## 2025-01-09 NOTE — ANESTHESIA PREPROCEDURE EVALUATION
Anesthesia Pre-Procedure Evaluation    Patient: Arlene Dominguez   MRN: [id number] : 1965        Procedure : Procedure(s):  INCISION AND DRAINAGE, HAND          History reviewed. No pertinent past medical history.   Past Surgical History:   Procedure Laterality Date    INCISION AND DRAINAGE FINGER, COMBINED Left 2025    Procedure: INCISION AND DRAINAGE, FINGER;  Surgeon: Wanda Gomez MD;  Location: [facility] OR    IRRIGATION AND DEBRIDEMENT FINGER, COMBINED Left 2025    Procedure: excisional debridement of the wound;  Surgeon: Wanda Gomez MD;  Location: [facility] OR      No Known Allergies   Social History     Tobacco Use    Smoking status: Never    Smokeless tobacco: Never   Substance Use Topics    Alcohol use: Yes     Comment: 2-4 beers on friday and saturday      Wt Readings from Last 1 Encounters:   25 102.6 kg (226 lb 3.2 oz)        Anesthesia Evaluation            ROS/MED HX  ENT/Pulmonary:    (-) sleep apnea and recent URI   Neurologic:    (-) no seizures, no CVA and no TIA   Cardiovascular:    (-) hypertension and CAD   METS/Exercise Tolerance: >4 METS    Hematologic:       Musculoskeletal:       GI/Hepatic:  - neg GI/hepatic ROS   (+) GERD,                   Renal/Genitourinary:  - neg Renal ROS  (-) renal disease   Endo:  - neg endo ROS  (-) Type II DM and thyroid disease   Psychiatric/Substance Use:       Infectious Disease: Comment: cellulitis      Malignancy:       Other:            Physical Exam    Airway        Mallampati: II   TM distance: > 3 FB   Neck ROM: full   Mouth opening: > 3 cm    Respiratory Devices and Support         Dental       (+) Minor Abnormalities - some fillings, tiny chips      Cardiovascular   cardiovascular exam normal          Pulmonary   pulmonary exam normal                OUTSIDE LABS:  CBC:   Lab Results   Component Value Date    WBC 6.3 2025    WBC 10.0 2025    HGB 11.8 2025    HGB 11.9 2025    HCT 34.9 (L)  "01/09/2025    HCT 34.8 (L) 01/08/2025     01/09/2025     01/08/2025     BMP:   Lab Results   Component Value Date     01/09/2025     01/08/2025    POTASSIUM 4.0 01/09/2025    POTASSIUM 3.8 01/08/2025    CHLORIDE 104 01/09/2025    CHLORIDE 103 01/08/2025    CO2 24 01/09/2025    CO2 27 01/08/2025    BUN 9.7 01/09/2025    BUN 8.0 01/08/2025    CR 0.65 01/09/2025    CR 0.59 01/08/2025    GLC 86 01/09/2025    GLC 84 01/09/2025     COAGS: No results found for: \"PTT\", \"INR\", \"FIBR\"  POC: No results found for: \"BGM\", \"HCG\", \"HCGS\"  HEPATIC: No results found for: \"ALBUMIN\", \"PROTTOTAL\", \"ALT\", \"AST\", \"GGT\", \"ALKPHOS\", \"BILITOTAL\", \"BILIDIRECT\", \"REBEL\"  OTHER:   Lab Results   Component Value Date    A1C 5.5 01/07/2025    RAFAT 9.3 01/09/2025    PHOS 4.0 01/08/2025    MAG 2.0 01/08/2025       Anesthesia Plan    ASA Status:  2       Anesthesia Type: MAC.              Consents    Anesthesia Plan(s) and associated risks, benefits, and realistic alternatives discussed. Questions answered and patient/representative(s) expressed understanding.     - Discussed:     - Discussed with:  Patient            Postoperative Care    Pain management: IV analgesics, Peripheral nerve block (Single Shot), Oral pain medications.        Comments:               Ramon Dinh MD    I have reviewed the pertinent notes and labs in the chart from the past 30 days and (re)examined the patient.  Any updates or changes from those notes are reflected in this note.                          # Obesity: Estimated body mass index is 37.64 kg/m  as calculated from the following:    Height as of this encounter: 1.651 m (5' 5\").    Weight as of this encounter: 102.6 kg (226 lb 3.2 oz)., PRESENT ON ADMISSION     # Financial/Environmental Concerns: none        "

## 2025-01-09 NOTE — ANESTHESIA CARE TRANSFER NOTE
Patient: Arlene Dominguez    Procedure: Procedure(s):  INCISION AND DRAINAGE, HAND, LEFT THUMB       Diagnosis: Abscess of left thumb [L02.512]  Diagnosis Additional Information: No value filed.    Anesthesia Type:   MAC     Note:    Oropharynx: oropharynx clear of all foreign objects and spontaneously breathing  Level of Consciousness: awake  Oxygen Supplementation: room air    Independent Airway: airway patency satisfactory and stable  Dentition: dentition unchanged  Vital Signs Stable: post-procedure vital signs reviewed and stable  Report to RN Given: handoff report given  Patient transferred to: Medical/Surgical Unit    Handoff Report: Identifed the Patient, Identified the Reponsible Provider, Reviewed the pertinent medical history, Discussed the surgical course, Reviewed Intra-OP anesthesia mangement and issues during anesthesia, Set expectations for post-procedure period and Allowed opportunity for questions and acknowledgement of understanding      Vitals:  Vitals Value Taken Time   /72    Temp 36.3    Pulse 87    Resp 22    SpO2 99        Electronically Signed By: GLORIA Tran CRNA  January 9, 2025  5:31 PM

## 2025-01-09 NOTE — PROGRESS NOTES
Messaged by nurse about increased swelling and redness of the left hand/arm. US completed earlier in the night showed no DVT though did comment on diminutive ulnar and radial veins. Cultures growing Pseudomonas.     Vitally stable. Patient is alert and cooperative. Reports increased pain and difficulty bending the hand. Has very mild tingling in hand. Good radial pulse. Skin along incision is erythematous and macerated. Red streaking noted up the arm to the elbow though difficult to appreciate in the image below.             Plan:  - continue antibiotics (ceftriaxone and vancomycin)  - NPO for repeat procedure tomorrow  - hold further soaks  - elevate LUE above the level of the heart as possible    Judi Wiggins MD

## 2025-01-09 NOTE — PROGRESS NOTES
"Orthopedic Progress Note      Assessment: 2 Days Post-Op  S/P Procedure(s):  INCISION AND DRAINAGE, FINGER  Ongoing severe erythema and swelling.  Will take to the OR again today for repeat I/D     Plan:   - Continue PT/OT.  Encourage gentle ROM of hand and fingers  - Weightbearing status: NWB LUE  - Activity: Up with assist and assistive device until independent.  - Anticoagulation: Hold any anticoagulation today.  SCDs, navin stockings and early ambulation.  - Antibiotics: Per ID, appreciate recs.  IV vanco and ceftriaxone for now  - Cultures show P. Aeruginosa  - Pain Management; continue current regimen  - Diet: NPO  - Labs: hgb 11.8, transfuse if <7.0. No indication today.  WBC 6.3.  CRP 80.00  - Dressing: Remove dressing TID for warm soapy water soaks, 20 minutes each.  Replace with gauze dressing - non-adherent on the incision, bulky gauze, ACE wrap  - Elevation: Elevate LUE on pillow to keep above the level of the heart as much as possible  - Follow-up: Outpatient follow up in 2 weeks  - Disposition: Pending improvement.  Repeat I/D today with Dr. Holt      Subjective:  Pain: moderate  Nausea, Vomiting:  No  Lightheadedness, Dizziness:  No  Neuro:  Patient denies new onset numbness or paresthesias  Fever, chills: No  Chest pain: No  SOB: No    Patient reports feeling well today. Patient reports pain is tolerable with current pain regimen. Denies fevers/chills today.  Did not get a third soak yesterday due to concern for macerated skin.   All questions/concerns answered.      Objective:  /67 (BP Location: Right arm)   Pulse 72   Temp 98.6  F (37  C) (Oral)   Resp 18   Ht 1.651 m (5' 5\")   Wt 102.6 kg (226 lb 3.2 oz)   SpO2 92%   BMI 37.64 kg/m      The patient is A&Ox3. Appears comfortable, sitting up at bedside.  Sensation is intact to light touch to radial and ulnar side of all fingers  Radial pulse intact.  Erythema and swelling severe but improved compared to preop.  Serous fluid with some " "mild purulence expressed with palpation  Penrose drains removed      Pertinent Labs   Lab Results: personally reviewed.   No results found for: \"INR\", \"PROTIME\"  Lab Results   Component Value Date    WBC 6.3 01/09/2025    HGB 11.8 01/09/2025    HCT 34.9 (L) 01/09/2025    MCV 94 01/09/2025     01/09/2025     Lab Results   Component Value Date     01/09/2025    CO2 24 01/09/2025       All cultures:  Recent Labs   Lab 01/07/25  1807 01/07/25  1805 01/06/25  1717 01/06/25  1707   CULTURE No anaerobic organisms isolated after 1 day  Culture in progress  1+ Pseudomonas aeruginosa*  See corresponding culture for results No anaerobic organisms isolated after 1 day  Culture in progress  3+ Pseudomonas aeruginosa*  See corresponding culture for results No growth after 2 days No growth after 2 days           Report completed by:  RAGHAVENDRA LEAL PA-C  Date: 01/09/2025    [facility]              "

## 2025-01-09 NOTE — PROGRESS NOTES
[facility]    Medicine Progress Note - Hospitalist Service    Date of Admission:  1/6/2025    Assessment & Plan   Arlene Dominguez is a 59 year old female admitted on 1/6/2025. She has a history of depression and insomnia and was admitted for evaluation of left first digit erythema/edema concerning for cellulitis vs. abscess. She is now post op day 2 from I&D with Orthopedics.     Left first digit MCP phlegmon    Left first digit abscess and cellulitis  Patient presented for 2 days of worsening pain, edema, and erythema on the volar aspect of the left first digit. She is now post-op day 2 from I&D with Orthopedic surgery for abscess and developing cellulitis of the left first MCP joint. Orthopedics to take patient to OR for repeat I&D today. Purulent drainage from initial I&D culture was significant for pseudomonas. Overnight event significant for development of streaking erythema and pain at her left elbow. US of both upper extremities without evidence of DVT. She has associated pain but full ROM at the left elbow. She remains afebrile and blood cultures have been without growth to date. WBC improved from 10 to 6. BMP stable. Pain is manageable on current regimen.   -Ortho consult, recs appreciated    -To OR for additional wound washout today, 1/9.   -I&D culture significant for pseudomonas   -Dressing: TBD - will hold on soaks d/t concerns of allergic reaction. Okay to remove dressing until after OR.    -LUE NWB. Keep elevated, above heart as much as possible    -Infectious Disease consult, recs appreciated     -abx to change per ID    -Continue vancomycin and ceftriaxone IV (1/6 - current), defer abx change to ID  -Pain regimen:   -Tylenol 975mg PO TID   -Oxycodone to 5-10mg q4h PRN for moderate pain   -dilaudid 0.4mg IV q2h PRN    Depression  Insomnia   - Continue PTA sertraline and trazodone     Chronic conditions  - GERD: PTA omeprazole  - HSV: PTA acyclovir         Diet: Discharge  "Instruction - Regular Diet Adult  NPO per Anesthesia Guidelines for Procedure/Surgery Except for: Meds    DVT Prophylaxis: VTE Prophylaxis contraindicated due to plan for procedure  Mckeon Catheter: Not present  Lines: None     Cardiac Monitoring: None  Code Status: Full Code      Clinically Significant Risk Factors                              # Obesity: Estimated body mass index is 37.64 kg/m  as calculated from the following:    Height as of this encounter: 1.651 m (5' 5\").    Weight as of this encounter: 102.6 kg (226 lb 3.2 oz)., PRESENT ON ADMISSION       # Financial/Environmental Concerns: none         Social Drivers of Health              Disposition Plan     Medically Ready for Discharge: Anticipated in 2-4 Days     The patient's care was discussed with the Attending Physician, Dr. Garza .    Edenilson Kirk, MS3    I was present with the medical student who participated in the service and in the documentation of this note. I have verified the history and personally performed the physical exam and medical decision making, and have verified the content of the note, which accurately reflects my assessment of the patient and the plan of care.     Luke Alba MD PGY2  [facility]  Securely message with [employer] (more info)  Text page via [employer] Paging/Directory   ______________________________________________________________________    Interval History   NAOE. To be taken for I&D by Orthopedic Surgery today, 1/9. Has had continued pain in left thumb. She feels like the swelling and pain have worsened. The pain is still manageable on current regimen. No chest pain, dyspnea, fevers, chills, abdominal pain, nausea or vomiting.    Physical Exam   Vital Signs: Temp: 98.6  F (37  C) Temp src: Oral BP: 119/67 Pulse: 72   Resp: 18 SpO2: 92 % O2 Device: None (Room air)    Weight: 226 lbs 3.2 oz    Physical Exam  Constitutional:       General: She is not in acute " distress.     Appearance: Normal appearance.   HENT:      Head: Normocephalic and atraumatic.   Eyes:      General: No scleral icterus.  Cardiovascular:      Pulses: Normal pulses.      Heart sounds: Normal heart sounds.   Pulmonary:      Effort: Pulmonary effort is normal.   Musculoskeletal:      Comments: L thumb - violaceous surgical site, draining serosanguinous fluid. L thumb ROM limited d/t swelling and pain. Left digits 2-5 ROM are intact to flexion and extension. No extremity pallor. Left radial pulse strong and intact.   Skin:     Comments: L 1st digit MCP with skin changes as above. Left elbow with streaking erythema present.   Neurological:      General: No focal deficit present.      Mental Status: She is alert and oriented to person, place, and time.       Medical Decision Making       ------------------ MEDICAL DECISION MAKING ------------------------------------------------------------------------------------------------------      Data   ------------------------- PAST 24 HR DATA REVIEWED -----------------------------------------------

## 2025-01-09 NOTE — PLAN OF CARE
Problem: Adult Inpatient Plan of Care  Goal: Plan of Care Review  Description: The Plan of Care Review/Shift note should be completed every shift.  The Outcome Evaluation is a brief statement about your assessment that the patient is improving, declining, or no change.  This information will be displayed automatically on your shift  note.  Outcome: Progressing     Problem: Adult Inpatient Plan of Care  Goal: Absence of Hospital-Acquired Illness or Injury  Outcome: Progressing     Problem: Wound  Goal: Optimal Functional Ability  Intervention: Optimize Functional Ability  Recent Flowsheet Documentation  Taken 1/9/2025 0800 by Kun Tierney, RN  Activity Management: activity adjusted per tolerance  Activity Assistance Provided: independent   Goal Outcome Evaluation:  Patient alert oriented x 4, Independent in room, hand soak and dressing changed, IV ABX's. Incision  drainage this evening, Oxycodone 10 mg, excedrin given.

## 2025-01-10 PROBLEM — L02.91 PHLEGMONOUS CELLULITIS: Status: ACTIVE | Noted: 2025-01-06

## 2025-01-10 LAB
ANION GAP SERPL CALCULATED.3IONS-SCNC: 11 MMOL/L (ref 7–15)
BACTERIA SPEC CULT: ABNORMAL
BUN SERPL-MCNC: 7.1 MG/DL (ref 8–23)
CALCIUM SERPL-MCNC: 9.1 MG/DL (ref 8.8–10.4)
CHLORIDE SERPL-SCNC: 105 MMOL/L (ref 98–107)
CREAT SERPL-MCNC: 0.62 MG/DL (ref 0.51–0.95)
EGFRCR SERPLBLD CKD-EPI 2021: >90 ML/MIN/1.73M2
ERYTHROCYTE [DISTWIDTH] IN BLOOD BY AUTOMATED COUNT: 12.2 % (ref 10–15)
GLUCOSE SERPL-MCNC: 87 MG/DL (ref 70–99)
HCO3 SERPL-SCNC: 25 MMOL/L (ref 22–29)
HCT VFR BLD AUTO: 32.2 % (ref 35–47)
HGB BLD-MCNC: 11 G/DL (ref 11.7–15.7)
MCH RBC QN AUTO: 31.5 PG (ref 26.5–33)
MCHC RBC AUTO-ENTMCNC: 34.2 G/DL (ref 31.5–36.5)
MCV RBC AUTO: 92 FL (ref 78–100)
PLATELET # BLD AUTO: 282 10E3/UL (ref 150–450)
POTASSIUM SERPL-SCNC: 3.7 MMOL/L (ref 3.4–5.3)
RBC # BLD AUTO: 3.49 10E6/UL (ref 3.8–5.2)
SODIUM SERPL-SCNC: 141 MMOL/L (ref 135–145)
WBC # BLD AUTO: 5.4 10E3/UL (ref 4–11)

## 2025-01-10 PROCEDURE — 250N000013 HC RX MED GY IP 250 OP 250 PS 637: Performed by: FAMILY MEDICINE

## 2025-01-10 PROCEDURE — 36415 COLL VENOUS BLD VENIPUNCTURE: CPT

## 2025-01-10 PROCEDURE — 258N000003 HC RX IP 258 OP 636: Performed by: FAMILY MEDICINE

## 2025-01-10 PROCEDURE — 250N000011 HC RX IP 250 OP 636: Performed by: FAMILY MEDICINE

## 2025-01-10 PROCEDURE — 120N000001 HC R&B MED SURG/OB

## 2025-01-10 PROCEDURE — 250N000011 HC RX IP 250 OP 636

## 2025-01-10 PROCEDURE — 250N000013 HC RX MED GY IP 250 OP 250 PS 637

## 2025-01-10 PROCEDURE — 82310 ASSAY OF CALCIUM: CPT

## 2025-01-10 PROCEDURE — 80048 BASIC METABOLIC PNL TOTAL CA: CPT

## 2025-01-10 PROCEDURE — 85014 HEMATOCRIT: CPT

## 2025-01-10 PROCEDURE — 99207 PR NO CHARGE LOS: CPT | Performed by: INTERNAL MEDICINE

## 2025-01-10 RX ORDER — ONDANSETRON 4 MG/1
4 TABLET, ORALLY DISINTEGRATING ORAL EVERY 6 HOURS PRN
Status: DISCONTINUED | OUTPATIENT
Start: 2025-01-10 | End: 2025-01-10

## 2025-01-10 RX ORDER — AMOXICILLIN 250 MG
1 CAPSULE ORAL 2 TIMES DAILY PRN
Qty: 30 TABLET | Refills: 0 | Status: SHIPPED | OUTPATIENT
Start: 2025-01-10

## 2025-01-10 RX ORDER — ONDANSETRON 2 MG/ML
4 INJECTION INTRAMUSCULAR; INTRAVENOUS EVERY 6 HOURS PRN
Status: DISCONTINUED | OUTPATIENT
Start: 2025-01-10 | End: 2025-01-10

## 2025-01-10 RX ORDER — ACETAMINOPHEN 325 MG/1
975 TABLET ORAL 3 TIMES DAILY
Qty: 100 TABLET | Refills: 0 | Status: SHIPPED | OUTPATIENT
Start: 2025-01-10

## 2025-01-10 RX ADMIN — SODIUM CHLORIDE 1750 MG: 9 INJECTION, SOLUTION INTRAVENOUS at 10:24

## 2025-01-10 RX ADMIN — CEFEPIME HYDROCHLORIDE 2 G: 2 INJECTION, POWDER, FOR SOLUTION INTRAVENOUS at 08:52

## 2025-01-10 RX ADMIN — CEFEPIME HYDROCHLORIDE 2 G: 2 INJECTION, POWDER, FOR SOLUTION INTRAVENOUS at 20:58

## 2025-01-10 RX ADMIN — OXYCODONE HYDROCHLORIDE 10 MG: 5 TABLET ORAL at 08:49

## 2025-01-10 RX ADMIN — OXYCODONE HYDROCHLORIDE 10 MG: 5 TABLET ORAL at 23:14

## 2025-01-10 RX ADMIN — OXYCODONE HYDROCHLORIDE 10 MG: 5 TABLET ORAL at 18:15

## 2025-01-10 RX ADMIN — PANTOPRAZOLE SODIUM 40 MG: 40 TABLET, DELAYED RELEASE ORAL at 06:56

## 2025-01-10 RX ADMIN — OXYCODONE HYDROCHLORIDE 10 MG: 5 TABLET ORAL at 13:09

## 2025-01-10 RX ADMIN — ACETAMINOPHEN 975 MG: 325 TABLET ORAL at 20:58

## 2025-01-10 RX ADMIN — SODIUM CHLORIDE 1750 MG: 9 INJECTION, SOLUTION INTRAVENOUS at 22:28

## 2025-01-10 RX ADMIN — TRAZODONE HYDROCHLORIDE 100 MG: 100 TABLET ORAL at 22:29

## 2025-01-10 RX ADMIN — SERTRALINE HYDROCHLORIDE 100 MG: 100 TABLET ORAL at 08:49

## 2025-01-10 RX ADMIN — ACYCLOVIR 400 MG: 400 TABLET ORAL at 08:50

## 2025-01-10 RX ADMIN — HYDROMORPHONE HYDROCHLORIDE 0.4 MG: 0.2 INJECTION, SOLUTION INTRAMUSCULAR; INTRAVENOUS; SUBCUTANEOUS at 14:38

## 2025-01-10 ASSESSMENT — ACTIVITIES OF DAILY LIVING (ADL)
ADLS_ACUITY_SCORE: 36
ADLS_ACUITY_SCORE: 35
ADLS_ACUITY_SCORE: 35
ADLS_ACUITY_SCORE: 22
ADLS_ACUITY_SCORE: 35
ADLS_ACUITY_SCORE: 22
ADLS_ACUITY_SCORE: 35
ADLS_ACUITY_SCORE: 22
ADLS_ACUITY_SCORE: 22
ADLS_ACUITY_SCORE: 35
ADLS_ACUITY_SCORE: 22
ADLS_ACUITY_SCORE: 35
ADLS_ACUITY_SCORE: 22
ADLS_ACUITY_SCORE: 35
ADLS_ACUITY_SCORE: 36
ADLS_ACUITY_SCORE: 22
ADLS_ACUITY_SCORE: 36

## 2025-01-10 NOTE — PLAN OF CARE
"  Problem: Adult Inpatient Plan of Care  Goal: Patient-Specific Goal (Individualized)  Description: You can add care plan individualizations to a care plan. Examples of Individualization might be:  \"Parent requests to be called daily at 9am for status\", \"I have a hard time hearing out of my right ear\", or \"Do not touch me to wake me up as it startles  me\".  Outcome: Progressing     Problem: Adult Inpatient Plan of Care  Goal: Absence of Hospital-Acquired Illness or Injury  Intervention: Prevent Skin Injury  Recent Flowsheet Documentation  Taken 1/10/2025 1419 by Kun Tierney, RN  Body Position: position changed independently     Problem: Wound  Goal: Optimal Functional Ability  Intervention: Optimize Functional Ability  Recent Flowsheet Documentation  Taken 1/10/2025 1419 by Kun Tierney, RN  Activity Management: activity adjusted per tolerance  Activity Assistance Provided: independent   Goal Outcome Evaluation:  Patient alert oriented x 4, Independent in room, right hand soak x 2, packing to remain in till tomorrow, MD will remove, received PRN Oxycodone 10 mg x 2, ice pack x 2, IV ABX's, spouse at bedside.           "

## 2025-01-10 NOTE — PLAN OF CARE
Problem: Adult Inpatient Plan of Care  Goal: Absence of Hospital-Acquired Illness or Injury  Intervention: Identify and Manage Fall Risk  Recent Flowsheet Documentation  Taken 1/10/2025 0140 by Halie Bal RN  Safety Promotion/Fall Prevention: nonskid shoes/slippers when out of bed  Intervention: Prevent Skin Injury  Recent Flowsheet Documentation  Taken 1/10/2025 0140 by Halie Bal RN  Body Position: position changed independently     Problem: Pain Acute  Goal: Optimal Pain Control and Function  Intervention: Prevent or Manage Pain  Recent Flowsheet Documentation  Taken 1/10/2025 0140 by Halie Bal RN  Medication Review/Management: medications reviewed     Problem: Wound  Goal: Optimal Functional Ability  Intervention: Optimize Functional Ability  Recent Flowsheet Documentation  Taken 1/10/2025 0140 by Halie Bal RN  Activity Management:   activity adjusted per tolerance   activity encouraged  Activity Assistance Provided: assistance, stand-by  Goal: Skin Health and Integrity  Intervention: Optimize Skin Protection  Recent Flowsheet Documentation  Taken 1/10/2025 0140 by Halie Bal RN  Activity Management:   activity adjusted per tolerance   activity encouraged  Head of Bed (HOB) Positioning: HOB at 30 degrees   Goal Outcome Evaluation:       Pt alert and oriented X4, denies pain, dressing to surgical site C/D/I with sling in place. Pt had a restful night.

## 2025-01-10 NOTE — PLAN OF CARE
Problem: Adult Inpatient Plan of Care  Goal: Plan of Care Review  Outcome: Progressing  Flowsheets (Taken 1/9/2025 2056)  Plan of Care Reviewed With:   patient   significant other  Overall Patient Progress: improving  Goal: Patient-Specific Goal (Individualized)  Outcome: Progressing  Goal: Absence of Hospital-Acquired Illness or Injury  Outcome: Progressing  Intervention: Identify and Manage Fall Risk  Recent Flowsheet Documentation  Taken 1/9/2025 1800 by Adegun, Oluwadamilola, RN  Safety Promotion/Fall Prevention: nonskid shoes/slippers when out of bed  Intervention: Prevent Skin Injury  Recent Flowsheet Documentation  Taken 1/9/2025 1800 by Adegun, Oluwadamilola, RN  Body Position: position changed independently  Goal: Optimal Comfort and Wellbeing  Outcome: Progressing  Goal: Readiness for Transition of Care  Outcome: Progressing     Problem: Infection  Goal: Absence of Infection Signs and Symptoms  Outcome: Progressing     Problem: Pain Acute  Goal: Optimal Pain Control and Function  Outcome: Progressing  Intervention: Prevent or Manage Pain  Recent Flowsheet Documentation  Taken 1/9/2025 1800 by Adegun, Oluwadamilola, RN  Medication Review/Management: medications reviewed     Problem: Comorbidity Management  Goal: Maintenance of Behavioral Health Symptom Control  Outcome: Progressing  Intervention: Maintain Behavioral Health Symptom Control  Recent Flowsheet Documentation  Taken 1/9/2025 1800 by Adegun, Oluwadamilola, RN  Medication Review/Management: medications reviewed     Problem: Electrolyte Imbalance  Goal: Electrolyte Balance  Outcome: Progressing     Problem: Skin or Soft Tissue Infection  Goal: Absence of Infection Signs and Symptoms  Outcome: Progressing     Problem: Wound  Goal: Optimal Coping  Outcome: Progressing  Goal: Optimal Functional Ability  Outcome: Progressing  Intervention: Optimize Functional Ability  Recent Flowsheet Documentation  Taken 1/9/2025 1800 by Adegun, Oluwadamilola,  RN  Activity Management: activity adjusted per tolerance  Activity Assistance Provided: assistance, stand-by  Goal: Absence of Infection Signs and Symptoms  Outcome: Progressing  Goal: Improved Oral Intake  Outcome: Progressing  Goal: Optimal Pain Control and Function  Outcome: Progressing  Goal: Skin Health and Integrity  Outcome: Progressing  Intervention: Optimize Skin Protection  Recent Flowsheet Documentation  Taken 1/9/2025 1800 by Adegun, Oluwadamilola, RN  Activity Management: activity adjusted per tolerance  Head of Bed (HOB) Positioning: HOB at 30 degrees  Goal: Optimal Wound Healing  Outcome: Progressing   Goal Outcome Evaluation: Pt arrived unit at approx 1800 from repeat I&D. Pt alert and oriented, able to make needs known. Pt denies pain, dressing to R hand intact, sling in place.       Plan of Care Reviewed With: patient, significant other    Overall Patient Progress: improvingOverall Patient Progress: improving

## 2025-01-10 NOTE — PROGRESS NOTES
[facility]    Medicine Progress Note - Hospitalist Service    Date of Admission:  1/6/2025    Assessment & Plan   Arlene Dominguez is a 59 year old female admitted on 1/6/2025. She has a history of depression and insomnia and was admitted for evaluation of left first digit erythema/edema concerning for cellulitis vs abscess, now s/p I&D x2 w/ortho.    Left first digit MCP phlegmon    Left first digit abscess and cellulitis  Patient presented for 2 days of worsening pain, edema, and erythema on the volar aspect of the left first digit. Had I&D w/ortho on 1/7 and then again on 1/9. More necrotic tissue removed second time and packed. Purulent drainage from initial I&D culture was significant for pseudomonas. Empirically started on vanc+ceftriaxone, but was then switched to cefepime 1/10. She remains afebrile, WBC normalized. Pain is manageable on current regimen.   -Ortho following, recs appreciated    -Packing to be removed by ortho POD#2   -Regular diet, NPO at midnight in case of procedure tomorrow.    -Dressing: Remove dressing for 20min soaks TID, replace w/gauze dressing (non-adherent on incision, bulky gaze, ACE wrap).   -LUE NWB. Keep elevated, above heart as much as possible    -Follow-up in 2wks    -Infectious Disease consult, recs appreciated     -Vancomycin (1/6 - current)  -Ceftriaxone (1/6 - 1/9), transitioned to cefepime (1/9 - current)  -Pain regimen:   -Tylenol 975mg PO TID   -Oxycodone to 5-10mg q4h PRN for moderate pain   -dilaudid 0.4mg IV q2h PRN    Depression  Insomnia   - Continue PTA sertraline and trazodone     Chronic conditions  - GERD: PTA omeprazole  - HSV: PTA acyclovir         Diet: Discharge Instruction - Regular Diet Adult  Advance Diet as Tolerated: Regular Diet Adult  NPO per Anesthesia Guidelines for Procedure/Surgery Except for: Meds    DVT Prophylaxis: VTE Prophylaxis contraindicated due to plan for procedure  Mckeon Catheter: Not present  Lines: None    "  Cardiac Monitoring: None  Code Status: Full Code      Clinically Significant Risk Factors                              # Obesity: Estimated body mass index is 37.64 kg/m  as calculated from the following:    Height as of this encounter: 1.651 m (5' 5\").    Weight as of this encounter: 102.6 kg (226 lb 3.2 oz)., PRESENT ON ADMISSION       # Financial/Environmental Concerns: none         Social Drivers of Health    Food Insecurity: Unknown (1/9/2025)    Food Insecurity     Within the past 12 months, did you worry that your food would run out before you got money to buy more?: Patient declined     Within the past 12 months, did the food you bought just not last and you didn t have money to get more?: Patient declined   Housing Stability: Unknown (1/9/2025)    Housing Stability     Do you have housing? : Patient declined     Are you worried about losing your housing?: Patient declined   Financial Resource Strain: Unknown (1/9/2025)    Financial Resource Strain     Within the past 12 months, have you or your family members you live with been unable to get utilities (heat, electricity) when it was really needed?: Patient declined   Transportation Needs: Unknown (1/9/2025)    Transportation Needs     Within the past 12 months, has lack of transportation kept you from medical appointments, getting your medicines, non-medical meetings or appointments, work, or from getting things that you need?: Patient declined            Disposition Plan     Medically Ready for Discharge: Anticipated in 2-4 Days     The patient's care was discussed with the Attending Physician, Dr. Nataliia Rader .      Luke Alba MD PGY2  [facility]  Securely message with [employer] (more info)  Text page via [employer] Paging/Directory   ______________________________________________________________________    Interval History   NAOE. Complaining about wrist pain after soaks. Denies chest pain, " fevers/chills, dyspnea, abdominal, n/v. Hungry. Frustrated w/on and off NPO status.     Physical Exam   Vital Signs: Temp: 97.9  F (36.6  C) Temp src: Oral BP: 133/70 Pulse: 78   Resp: 18 SpO2: 92 % O2 Device: None (Room air) Oxygen Delivery: 2 LPM  Weight: 226 lbs 3.2 oz    Physical Exam  Constitutional:       General: She is not in acute distress.     Appearance: Normal appearance.   HENT:      Head: Normocephalic and atraumatic.   Eyes:      General: No scleral icterus.  Cardiovascular:      Pulses: Normal pulses.      Heart sounds: Normal heart sounds.   Pulmonary:      Effort: Pulmonary effort is normal.   Musculoskeletal:      Comments: LUE ROM grossly intact. L thumb wrapped in dressing and ACE wrap - dressing CDI.    Skin:     Comments: LUE - streaking resolved   Neurological:      General: No focal deficit present.      Mental Status: She is alert and oriented to person, place, and time.       Medical Decision Making       ------------------ MEDICAL DECISION MAKING ------------------------------------------------------------------------------------------------------      Data   ------------------------- PAST 24 HR DATA REVIEWED -----------------------------------------------

## 2025-01-10 NOTE — PROGRESS NOTES
Care Management Follow Up    Length of Stay (days): 4    Expected Discharge Date: 01/12/2025     Concerns to be Addressed:       Patient plan of care discussed at interdisciplinary rounds: Yes    Anticipated Discharge Disposition:  home with PT/OT              Anticipated Discharge Services:  homecare  Anticipated Discharge DME:      Patient/family educated on Medicare website which has current facility and service quality ratings:    Education Provided on the Discharge Plan:    Patient/Family in Agreement with the Plan:      Referrals Placed by CM/SW:    Private pay costs discussed: Not applicable    Discussed  Partnership in Safe Discharge Planning  document with patient/family: No     Handoff Completed: No, handoff not indicated or clinically appropriate    Additional Information:  Per chart review and rounds with Charge RN patient is NPO tonight for a procedure and not likely to discharge tomorrow. ID note states PO antibiotics    Next Steps: medical readiness    Torie Gamez RN

## 2025-01-10 NOTE — PROGRESS NOTES
Left thumb culture from 1/7 is growing Pseudomonas. Currently only vancomycin is ordered for patient. Appears ID physician ordered cefepime earlier in the day but it looks like the order was discontinued when patient went to OR.     Plan:   - Reordered cefepime  - Continue vancomycin    Alejandra Edmond MD

## 2025-01-10 NOTE — PROGRESS NOTES
"Orthopedic Progress Note      Assessment: 1 Day Post-Op  S/P Procedure(s):  INCISION AND DRAINAGE, FINGER 1/7/25 and 1/9/25       Plan:   - Continue PT/OT.  Encourage gentle ROM of hand and fingers  - Weightbearing status: NWB LUE  - Activity: Up with assist and assistive device until independent.  - Anticoagulation: Hold any anticoagulation today.  SCDs, navin stockings and early ambulation.  - Antibiotics: Per ID, appreciate recs.  IV vanco and ceftriaxone for now  - Cultures show P. Aeruginosa  - Pain Management; continue current regimen  - Diet: Regular diet.  NPO at midnight tonight  - Labs: hgb 11.0, transfuse if <7.0. No indication today.  WBC 5.4.  CRP 80.00  - Dressing: Remove dressing TID for warm soapy water soaks, 20 minutes each.  Replace with gauze dressing - non-adherent on the incision, bulky gauze, ACE wrap  - Packing to be removed by ortho POD2  - Elevation: Elevate LUE on pillow to keep above the level of the heart as much as possible  - Follow-up: Outpatient follow up in 2 weeks  - Disposition: Home pending further improvement, continuing to monitor      Subjective:  Pain: moderate  Nausea, Vomiting:  No  Lightheadedness, Dizziness:  No  Neuro:  Patient denies new onset numbness or paresthesias  Fever, chills: No  Chest pain: No  SOB: No    Patient reports feeling well today. Patient reports pain is tolerable with current pain regimen. Denies fevers/chills today.   All questions/concerns answered.      Objective:  /70 (BP Location: Right arm)   Pulse 78   Temp 97.9  F (36.6  C) (Oral)   Resp 18   Ht 1.651 m (5' 5\")   Wt 102.6 kg (226 lb 3.2 oz)   SpO2 92%   BMI 37.64 kg/m      The patient is A&Ox3. Appears comfortable, sitting up at bedside.  Sensation is intact to light touch to radial and ulnar side of all fingers  Radial pulse intact.  Thenar eminience still swollen and erythematous, skin around incision appears healthy, less friable today.  No signs of purulence today.  Some mild " "bleeding from incision  Penrose drains removed      Pertinent Labs   Lab Results: personally reviewed.   No results found for: \"INR\", \"PROTIME\"  Lab Results   Component Value Date    WBC 5.4 01/10/2025    HGB 11.0 (L) 01/10/2025    HCT 32.2 (L) 01/10/2025    MCV 92 01/10/2025     01/10/2025     Lab Results   Component Value Date     01/10/2025    CO2 25 01/10/2025       All cultures:  Recent Labs   Lab 01/09/25  1652 01/09/25  1650 01/07/25  1807 01/07/25  1805 01/06/25  1717 01/06/25  1707   CULTURE See corresponding culture for results See corresponding culture for results No anaerobic organisms isolated after 2 days  1+ Pseudomonas aeruginosa*  See corresponding culture for results 3+ Pseudomonas aeruginosa*  No anaerobic organisms isolated after 2 days  See corresponding culture for results No growth after 3 days No growth after 3 days           Report completed by:  RAGHAVENDRA LEAL PA-C  Date: 01/10/2025    [facility]              "

## 2025-01-10 NOTE — PROGRESS NOTES
[facility] Infectious Disease  Pt in shower  Cx preop pseudomonas  On vanco cefepime  Tomorrow should be ok for po abx , following cx from OR  Alternatively :  Discharge on po marlauin     Reagan Porter M.D.

## 2025-01-10 NOTE — PLAN OF CARE
Occupational Therapy Discharge Summary    Reason for therapy discharge:    All goals and outcomes met, no further needs identified.    Progress towards therapy goal(s). See goals on Care Plan in [facility] electronic health record for goal details.  Goals met    Therapy recommendation(s):    Continued therapy is recommended.  Rationale/Recommendations:  Outpt. hand therapy for thumb/hand as indicated after healing.

## 2025-01-11 LAB
ANION GAP SERPL CALCULATED.3IONS-SCNC: 10 MMOL/L (ref 7–15)
BACTERIA BLD CULT: NO GROWTH
BACTERIA BLD CULT: NO GROWTH
BUN SERPL-MCNC: 6.8 MG/DL (ref 8–23)
CALCIUM SERPL-MCNC: 9.3 MG/DL (ref 8.8–10.4)
CHLORIDE SERPL-SCNC: 106 MMOL/L (ref 98–107)
CREAT SERPL-MCNC: 0.69 MG/DL (ref 0.51–0.95)
CRP SERPL-MCNC: 24.9 MG/L
EGFRCR SERPLBLD CKD-EPI 2021: >90 ML/MIN/1.73M2
ERYTHROCYTE [DISTWIDTH] IN BLOOD BY AUTOMATED COUNT: 11.9 % (ref 10–15)
GLUCOSE BLDC GLUCOMTR-MCNC: 100 MG/DL (ref 70–99)
GLUCOSE SERPL-MCNC: 91 MG/DL (ref 70–99)
HCO3 SERPL-SCNC: 27 MMOL/L (ref 22–29)
HCT VFR BLD AUTO: 34 % (ref 35–47)
HGB BLD-MCNC: 11.4 G/DL (ref 11.7–15.7)
MCH RBC QN AUTO: 31.5 PG (ref 26.5–33)
MCHC RBC AUTO-ENTMCNC: 33.5 G/DL (ref 31.5–36.5)
MCV RBC AUTO: 94 FL (ref 78–100)
PLATELET # BLD AUTO: 307 10E3/UL (ref 150–450)
POTASSIUM SERPL-SCNC: 3.8 MMOL/L (ref 3.4–5.3)
RBC # BLD AUTO: 3.62 10E6/UL (ref 3.8–5.2)
SODIUM SERPL-SCNC: 143 MMOL/L (ref 135–145)
VANCOMYCIN SERPL-MCNC: 26.5 UG/ML
WBC # BLD AUTO: 4.5 10E3/UL (ref 4–11)

## 2025-01-11 PROCEDURE — 250N000011 HC RX IP 250 OP 636: Performed by: FAMILY MEDICINE

## 2025-01-11 PROCEDURE — 250N000013 HC RX MED GY IP 250 OP 250 PS 637

## 2025-01-11 PROCEDURE — 82565 ASSAY OF CREATININE: CPT

## 2025-01-11 PROCEDURE — 36415 COLL VENOUS BLD VENIPUNCTURE: CPT | Performed by: INTERNAL MEDICINE

## 2025-01-11 PROCEDURE — 85018 HEMOGLOBIN: CPT

## 2025-01-11 PROCEDURE — 82310 ASSAY OF CALCIUM: CPT

## 2025-01-11 PROCEDURE — 250N000011 HC RX IP 250 OP 636

## 2025-01-11 PROCEDURE — 85048 AUTOMATED LEUKOCYTE COUNT: CPT

## 2025-01-11 PROCEDURE — 99207 PR NO CHARGE LOS: CPT

## 2025-01-11 PROCEDURE — 250N000013 HC RX MED GY IP 250 OP 250 PS 637: Performed by: FAMILY MEDICINE

## 2025-01-11 PROCEDURE — 80202 ASSAY OF VANCOMYCIN: CPT | Performed by: INTERNAL MEDICINE

## 2025-01-11 PROCEDURE — 120N000001 HC R&B MED SURG/OB

## 2025-01-11 PROCEDURE — 80048 BASIC METABOLIC PNL TOTAL CA: CPT

## 2025-01-11 PROCEDURE — 99233 SBSQ HOSP IP/OBS HIGH 50: CPT | Mod: GC

## 2025-01-11 PROCEDURE — 258N000003 HC RX IP 258 OP 636: Performed by: FAMILY MEDICINE

## 2025-01-11 PROCEDURE — 86140 C-REACTIVE PROTEIN: CPT | Performed by: PHYSICIAN ASSISTANT

## 2025-01-11 RX ADMIN — SODIUM CHLORIDE 1500 MG: 9 INJECTION, SOLUTION INTRAVENOUS at 11:03

## 2025-01-11 RX ADMIN — OXYCODONE HYDROCHLORIDE 10 MG: 5 TABLET ORAL at 06:53

## 2025-01-11 RX ADMIN — CEFEPIME HYDROCHLORIDE 2 G: 2 INJECTION, POWDER, FOR SOLUTION INTRAVENOUS at 20:37

## 2025-01-11 RX ADMIN — ACETAMINOPHEN 975 MG: 325 TABLET ORAL at 09:47

## 2025-01-11 RX ADMIN — ACYCLOVIR 400 MG: 400 TABLET ORAL at 09:47

## 2025-01-11 RX ADMIN — ACETAMINOPHEN 975 MG: 325 TABLET ORAL at 20:36

## 2025-01-11 RX ADMIN — PANTOPRAZOLE SODIUM 40 MG: 40 TABLET, DELAYED RELEASE ORAL at 06:54

## 2025-01-11 RX ADMIN — ACETAMINOPHEN, ASPIRIN, CAFFEINE 1 TABLET: 250; 250; 65 TABLET, FILM COATED ORAL at 06:54

## 2025-01-11 RX ADMIN — CEFEPIME HYDROCHLORIDE 2 G: 2 INJECTION, POWDER, FOR SOLUTION INTRAVENOUS at 09:46

## 2025-01-11 RX ADMIN — TRAZODONE HYDROCHLORIDE 100 MG: 100 TABLET ORAL at 22:12

## 2025-01-11 RX ADMIN — ACETAMINOPHEN 975 MG: 325 TABLET ORAL at 14:27

## 2025-01-11 RX ADMIN — SERTRALINE HYDROCHLORIDE 100 MG: 100 TABLET ORAL at 09:47

## 2025-01-11 RX ADMIN — SODIUM CHLORIDE 1500 MG: 9 INJECTION, SOLUTION INTRAVENOUS at 22:39

## 2025-01-11 ASSESSMENT — ACTIVITIES OF DAILY LIVING (ADL)
ADLS_ACUITY_SCORE: 35
ADLS_ACUITY_SCORE: 33
ADLS_ACUITY_SCORE: 35
ADLS_ACUITY_SCORE: 33
ADLS_ACUITY_SCORE: 35
ADLS_ACUITY_SCORE: 35
ADLS_ACUITY_SCORE: 33
ADLS_ACUITY_SCORE: 35
ADLS_ACUITY_SCORE: 35
ADLS_ACUITY_SCORE: 33
ADLS_ACUITY_SCORE: 35
ADLS_ACUITY_SCORE: 33
ADLS_ACUITY_SCORE: 33
ADLS_ACUITY_SCORE: 35

## 2025-01-11 NOTE — PROGRESS NOTES
"Orthopedic Progress Note      Assessment: 2 Days Post-Op  S/P Procedure(s):  Incision and drainage of left thumb/hand abscess and  excisional debridement of the wound @ [facility].     Infeciton appears to be improving. NO evidence of abscess orflexor tenosynovitis.   No evidence of     Plan:   - Continue PT/OT  - Weightbearing status: Nonweightbearing left upper extremity. Range of motion as tolerated.   - Anticoagulation:Anticoagulation per medical service. OK to anticoagulate per medical team.    SCDs, navin stockings and early ambulation.  -Antibiotics: Per ID, appreciate recs. IV vanco and cefepime now.   - Cultures show P. Aeruginosa   -Diet: Patient may return to regular diet today.  NPO at midnight tonight for possible I&D if symptoms get worse overnight.   - Dressing: Remove dressing TID for warm soapy water soaks, 20 minutes each.  Replace with gauze dressing - non-adherent on the incision, bulky gauze, ACE wrap  - Packing strip removed today.   - Elevation: Elevate LUE on pillow to keep above the level of the heart as much as possible  - Follow-up: Outpatient follow up in 2 weeks  - Disposition: Home pending further improvement, continuing to monitor      Subjective:  Pain: Moderate  Chest pain, SOB: No  Nausea, Vomiting:  No  Lightheadedness, Dizziness:  No  Neuro:  Patient denies new onset numbness or paresthesias    Patient reports having a headache this morning. Patient reports continued pain in her left hand, but it is tolerable. She believes the pain has improved some since surgery. Denies any fevers or chills.     Objective:  /66 (BP Location: Right arm)   Pulse 78   Temp 98  F (36.7  C) (Oral)   Resp 19   Ht 1.651 m (5' 5\")   Wt 102.6 kg (226 lb 3.2 oz)   SpO2 92%   BMI 37.64 kg/m    The patient is A&Ox3. Appears comfortable.   Sensation is intact. Sensation to light touch intact along the radial and ulnar border of the thumb although slightly decreased on radial side of the " "thumb.   Palpable radial pulse.   Wound over thenar eminence. There are nylon suture in place. Granulation tissue present on the distal end of the wound.   Thenar eminence is swollen and mildly erythematous. Erythema appears improved from yesterday.  - NO significnat pain over the flexor tendon sheath. Mild incisional pain with gentle extension.   Mild bleeding from incision.   No purulent drainage from wound    Packing strip in wound, removed    Pertinent Labs   Lab Results: personally reviewed.   No results found for: \"INR\", \"PROTIME\"  Lab Results   Component Value Date    WBC 4.5 01/11/2025    HGB 11.4 (L) 01/11/2025    HCT 34.0 (L) 01/11/2025    MCV 94 01/11/2025     01/11/2025     Lab Results   Component Value Date     01/11/2025    CO2 27 01/11/2025         Report completed by:  Luisa Aguilar PA-C/Dr. Woodruff  [facility]    Date: 1/11/2025  Time: 7:23 AM      I Agree with the note written above.  I have edited to reflect my comments.    Signed,  Ricky Luna MD    "

## 2025-01-11 NOTE — PHARMACY-VANCOMYCIN DOSING SERVICE
Pharmacy Vancomycin Note  Date of Service 2025  Patient's  1965   59 year old, female    Indication: Bone and Joint Infection and Skin and Soft Tissue Infection  Day of Therapy: 5  Current vancomycin regimen:  1750 mg IV q12h  Current vancomycin monitoring method: AUC  Current vancomycin therapeutic monitoring goal: 400-600 mg*h/L    [employer] Prediction of Current Vancomycin Regimen  Regimen: 1750 mg IV every 12 hours.  Start time: 10:28 on 2025  Exposure target: AUC24 (range)400-600 mg/L.hr   AUC24,ss: 644 mg/L.hr  Probability of AUC24 > 400: 100 %  Ctrough,ss: 18 mg/L  Probability of Ctrough,ss > 20: 27 %  Probability of nephrotoxicity (Lodise GREGORY ): 14 %      Current estimated CrCl = Estimated Creatinine Clearance: 104.2 mL/min (based on SCr of 0.69 mg/dL).    Creatinine for last 3 days  2025:  6:02 AM Creatinine 0.65 mg/dL  1/10/2025:  5:57 AM Creatinine 0.62 mg/dL  2025:  5:09 AM Creatinine 0.69 mg/dL    Recent Vancomycin Levels (past 3 days)  2025:  5:09 AM Vancomycin 26.5 ug/mL    Vancomycin IV Administrations (past 72 hours)                     vancomycin (VANCOCIN) 1,750 mg in 0.9% NaCl 517.5 mL intermittent infusion (mg) 1,750 mg New Bag 01/10/25 2228     1,750 mg New Bag  1024     1,750 mg New Bag 25 2030     1,750 mg New Bag  0609     1,750 mg New Bag 25 1630                    Nephrotoxins and other renal medications (From now, onward)      Start     Dose/Rate Route Frequency Ordered Stop    25 1000  vancomycin (VANCOCIN) 1,500 mg in 0.9% NaCl 265 mL intermittent infusion         1,500 mg  over 90 Minutes Intravenous EVERY 12 HOURS 25 0717      25 0800  acyclovir (ZOVIRAX) tablet 400 mg        Note to Pharmacy: PTA Sig:Take 400 mg by mouth daily.      400 mg Oral DAILY 25 1916                 Contrast Orders - past 72 hours (72h ago, onward)      None            Interpretation of levels and current regimen:  Vancomycin level  is reflective of AUC greater than 600    Has serum creatinine changed greater than 50% in last 72 hours: No    Urine output:  unable to determine    Renal Function: Stable    [employer] Prediction of Planned New Vancomycin Regimen  Regimen: 1500 mg IV every 12 hours.  Start time: 10:28 on 01/11/2025  Exposure target: AUC24 (range)400-600 mg/L.hr   AUC24,ss: 552 mg/L.hr  Probability of AUC24 > 400: 100 %  Ctrough,ss: 15.2 mg/L  Probability of Ctrough,ss > 20: 6 %  Probability of nephrotoxicity (Lodise GREGORY 2009): 10 %      Plan:  Decrease Dose to 1500mg IV q12h  - AUC was low on 1250mg IV q12h previously and today may be last doses of IV antibiotics  Vancomycin monitoring method: AUC  Vancomycin therapeutic monitoring goal: 400-600 mg*h/L  Pharmacy will check vancomycin levels as appropriate in 3-5 Days.  Serum creatinine levels will be ordered daily for the first week of therapy and at least twice weekly for subsequent weeks.    María Royal RPH

## 2025-01-11 NOTE — PROGRESS NOTES
Infectious Disease Chart Check    Seen by ortho--possible return to OR tomorrow.    Still on IV antibiotics. No new pos cultures beyond known Ps aeruginosa.    ENIO YODER MD

## 2025-01-11 NOTE — PROGRESS NOTES
[facility]    Medicine Progress Note - Hospitalist Service    Date of Admission:  1/6/2025    Assessment & Plan   Arlene Dominguez is a 59 year old female admitted on 1/6/2025. She has a history of depression and insomnia and was admitted for evaluation of left first digit erythema/edema concerning for cellulitis vs abscess, now s/p I&D x2 w/ortho. Possible repeat I&D tomorrow, NPO at midnight.     Left first digit MCP phlegmon    Left first digit abscess and cellulitis  Patient presented for 2 days of worsening pain, edema, and erythema on the volar aspect of the left first digit. Had I&D w/ortho on 1/7 and then again on 1/9. More necrotic tissue removed second time and packed. Purulent drainage from initial I&D culture was significant for pseudomonas. Empirically started on vanc+ceftriaxone, but was then switched to cefepime 1/10. She remains afebrile, WBC normalized. Pain is manageable on current regimen.   -Ortho following, recs appreciated    - Possible OR tomorrow    - NPO at midnight    - Wound management per ortho recs   -Infectious Disease consult, recs appreciated     -Vancomycin (1/6 - current)  -Ceftriaxone (1/6 - 1/9), transitioned to cefepime (1/9 - current)  -Pain regimen:   -Tylenol 975mg PO TID   -Oxycodone to 5-10mg q4h PRN for moderate pain   -dilaudid 0.4mg IV q2h PRN    Depression  Insomnia   - Continue PTA sertraline and trazodone     Chronic conditions  - GERD: PTA omeprazole  - HSV: PTA acyclovir         Diet: Discharge Instruction - Regular Diet Adult  NPO per Anesthesia Guidelines for Procedure/Surgery Except for: Meds  Regular Diet Adult    DVT Prophylaxis: VTE Prophylaxis contraindicated due to plan for procedure  Mckeon Catheter: Not present  Lines: None     Cardiac Monitoring: None  Code Status: Full Code      Clinically Significant Risk Factors                              # Obesity: Estimated body mass index is 37.64 kg/m  as calculated from the following:     "Height as of this encounter: 1.651 m (5' 5\").    Weight as of this encounter: 102.6 kg (226 lb 3.2 oz).        # Financial/Environmental Concerns: none         Social Drivers of Health    Food Insecurity: Unknown (1/9/2025)    Food Insecurity     Within the past 12 months, did you worry that your food would run out before you got money to buy more?: Patient declined     Within the past 12 months, did the food you bought just not last and you didn t have money to get more?: Patient declined   Housing Stability: Unknown (1/9/2025)    Housing Stability     Do you have housing? : Patient declined     Are you worried about losing your housing?: Patient declined   Financial Resource Strain: Unknown (1/9/2025)    Financial Resource Strain     Within the past 12 months, have you or your family members you live with been unable to get utilities (heat, electricity) when it was really needed?: Patient declined   Transportation Needs: Unknown (1/9/2025)    Transportation Needs     Within the past 12 months, has lack of transportation kept you from medical appointments, getting your medicines, non-medical meetings or appointments, work, or from getting things that you need?: Patient declined            Disposition Plan     Medically Ready for Discharge: Anticipated in 2-4 Days     The patient's care was discussed with the Attending Physician, Dr. Garza .    Petty Gibbs MD PGY-3  [facility]    ________________________________    Interval History   No overnight events. OR possibly tomorrow.     Physical Exam   Vital Signs: Temp: 97.5  F (36.4  C) Temp src: Oral BP: 139/83 Pulse: 73   Resp: 18 SpO2: 97 % O2 Device: None (Room air)    Weight: 226 lbs 3.2 oz  Physical Exam  Constitutional:       General: She is not in acute distress.     Appearance: Normal appearance.   HENT:      Head: Normocephalic and atraumatic.   Eyes:      General: No scleral icterus.  Cardiovascular:      Pulses: Normal pulses.    "   Heart sounds: Normal heart sounds.   Pulmonary:      Effort: Pulmonary effort is normal.   Musculoskeletal:      Comments: LUE ROM grossly intact. Soaking hand per wound recs. No open sore on superficial exam.   Skin:     Comments: LUE - streaking resolved   Neurological:      General: No focal deficit present.      Mental Status: She is alert and oriented to person, place, and time.       Medical Decision Making       ------------------ MEDICAL DECISION MAKING ------------------------------------------------------------------------------------------------------      Data   ------------------------- PAST 24 HR DATA REVIEWED -----------------------------------------------

## 2025-01-11 NOTE — PLAN OF CARE
Goal Outcome Evaluation:  Patient is alert and oriented x4. Independent in the room, ambulates to the bathroom. Up in the chair during shift. Tolerates regular diet. NPO at midnight for possible procedure on 1/11.     Hand soak and dressing change completed on shift. Pain reported as throbbing in thumb, encouraged to elevate and limit use. Pain managed with PRN oxycodone and scheduled tylenol. Reported headache, declined PRN Excedrin.    Problem: Pain Acute  Goal: Optimal Pain Control and Function  Outcome: Progressing  Intervention: Develop Pain Management Plan  Recent Flowsheet Documentation  Taken 1/10/2025 1900 by Tri Silveira, RN  Pain Management Interventions: medication offered but refused  Intervention: Prevent or Manage Pain  Recent Flowsheet Documentation  Taken 1/10/2025 1600 by Tri Silveira, RN  Medication Review/Management: medications reviewed     Problem: Wound  Goal: Optimal Functional Ability  Outcome: Progressing  Intervention: Optimize Functional Ability  Recent Flowsheet Documentation  Taken 1/10/2025 1600 by Tri Silveira, RN  Activity Management: up in chair  Activity Assistance Provided: independent

## 2025-01-11 NOTE — PLAN OF CARE
Problem: Pain Acute  Goal: Optimal Pain Control and Function  Outcome: Progressing  Intervention: Develop Pain Management Plan  Recent Flowsheet Documentation  Taken 1/11/2025 1427 by Zollinger, Nancy K, RN  Pain Management Interventions: medication (see MAR)   Goal Outcome Evaluation:  Patient complaining of headache and left hand pain.  Taking tylenol for pain.  Up independently in room.  Hand soaked and rewrapped.  Continues on IV antibiotics.

## 2025-01-11 NOTE — PLAN OF CARE
Problem: Adult Inpatient Plan of Care  Goal: Optimal Comfort and Wellbeing  Intervention: Monitor Pain and Promote Comfort  Recent Flowsheet Documentation  Taken 1/11/2025 0654 by Lyla Webster RN  Pain Management Interventions:   medication (see MAR)   cold applied   pillow support provided  Taken 1/11/2025 0110 by Lyla Webster RN  Pain Management Interventions:   pain management plan reviewed with patient/caregiver   pillow support provided   rest  Goal: Readiness for Transition of Care  Outcome: Adequate for Care Transition     Problem: Pain Acute  Goal: Optimal Pain Control and Function  Outcome: Not Progressing  Intervention: Develop Pain Management Plan  Recent Flowsheet Documentation  Taken 1/11/2025 0654 by Lyla Webster RN  Pain Management Interventions:   medication (see MAR)   cold applied   pillow support provided  Taken 1/11/2025 0110 by Lyla Webster RN  Pain Management Interventions:   pain management plan reviewed with patient/caregiver   pillow support provided   rest     Problem: Wound  Goal: Optimal Pain Control and Function  Outcome: Progressing  Intervention: Prevent or Manage Pain  Recent Flowsheet Documentation  Taken 1/11/2025 0654 by Lyla Webster RN  Pain Management Interventions:   medication (see MAR)   cold applied   pillow support provided  Taken 1/11/2025 0110 by Lyla Webster RN  Pain Management Interventions:   pain management plan reviewed with patient/caregiver   pillow support provided   rest     Problem: Wound  Goal: Optimal Pain Control and Function  Intervention: Prevent or Manage Pain  Recent Flowsheet Documentation  Taken 1/11/2025 0654 by Lyla Webster RN  Pain Management Interventions:   medication (see MAR)   cold applied   pillow support provided  Taken 1/11/2025 0110 by Lyla Webster RN  Pain Management Interventions:   pain management plan reviewed with patient/caregiver   pillow support provided   rest     Goal Outcome  Evaluation:    POD 2: Left hand cellulitis I & D     Dressing clean, dry and intact with ace bandage in place. Left thumb slightly red and swollen. Hand elevated on pillow with ice pack. PRN oxycodone given for pain with good effect. Non-weight bearing to left hand. Scheduled IV antibiotic therapy to treat left hand abscess. Has been NPO since midnight for possible I & D. Additionally, had c/o posterior headache. PRN Excedrin Migraine and ice pack given.

## 2025-01-12 VITALS
DIASTOLIC BLOOD PRESSURE: 83 MMHG | BODY MASS INDEX: 37.69 KG/M2 | SYSTOLIC BLOOD PRESSURE: 123 MMHG | HEIGHT: 65 IN | HEART RATE: 72 BPM | TEMPERATURE: 97.8 F | OXYGEN SATURATION: 94 % | WEIGHT: 226.2 LBS | RESPIRATION RATE: 18 BRPM

## 2025-01-12 LAB
ANION GAP SERPL CALCULATED.3IONS-SCNC: 9 MMOL/L (ref 7–15)
BACTERIA TISS BX CULT: ABNORMAL
BUN SERPL-MCNC: 7 MG/DL (ref 8–23)
CALCIUM SERPL-MCNC: 9.5 MG/DL (ref 8.8–10.4)
CHLORIDE SERPL-SCNC: 106 MMOL/L (ref 98–107)
CREAT SERPL-MCNC: 0.66 MG/DL (ref 0.51–0.95)
EGFRCR SERPLBLD CKD-EPI 2021: >90 ML/MIN/1.73M2
ERYTHROCYTE [DISTWIDTH] IN BLOOD BY AUTOMATED COUNT: 11.9 % (ref 10–15)
GLUCOSE SERPL-MCNC: 83 MG/DL (ref 70–99)
HCO3 SERPL-SCNC: 28 MMOL/L (ref 22–29)
HCT VFR BLD AUTO: 33.4 % (ref 35–47)
HGB BLD-MCNC: 11.3 G/DL (ref 11.7–15.7)
MCH RBC QN AUTO: 31.6 PG (ref 26.5–33)
MCHC RBC AUTO-ENTMCNC: 33.8 G/DL (ref 31.5–36.5)
MCV RBC AUTO: 93 FL (ref 78–100)
PLATELET # BLD AUTO: 314 10E3/UL (ref 150–450)
POTASSIUM SERPL-SCNC: 3.8 MMOL/L (ref 3.4–5.3)
RBC # BLD AUTO: 3.58 10E6/UL (ref 3.8–5.2)
SODIUM SERPL-SCNC: 143 MMOL/L (ref 135–145)
WBC # BLD AUTO: 4.8 10E3/UL (ref 4–11)

## 2025-01-12 PROCEDURE — 82435 ASSAY OF BLOOD CHLORIDE: CPT

## 2025-01-12 PROCEDURE — 250N000011 HC RX IP 250 OP 636

## 2025-01-12 PROCEDURE — 250N000013 HC RX MED GY IP 250 OP 250 PS 637

## 2025-01-12 PROCEDURE — 36415 COLL VENOUS BLD VENIPUNCTURE: CPT

## 2025-01-12 PROCEDURE — 250N000013 HC RX MED GY IP 250 OP 250 PS 637: Performed by: FAMILY MEDICINE

## 2025-01-12 PROCEDURE — 99232 SBSQ HOSP IP/OBS MODERATE 35: CPT

## 2025-01-12 PROCEDURE — 80048 BASIC METABOLIC PNL TOTAL CA: CPT

## 2025-01-12 PROCEDURE — 85027 COMPLETE CBC AUTOMATED: CPT

## 2025-01-12 RX ORDER — CIPROFLOXACIN 500 MG/5ML
500 KIT ORAL EVERY 12 HOURS SCHEDULED
Status: DISCONTINUED | OUTPATIENT
Start: 2025-01-12 | End: 2025-01-12

## 2025-01-12 RX ORDER — CIPROFLOXACIN 500 MG/1
500 TABLET, FILM COATED ORAL EVERY 12 HOURS SCHEDULED
Status: DISCONTINUED | OUTPATIENT
Start: 2025-01-12 | End: 2025-01-12 | Stop reason: HOSPADM

## 2025-01-12 RX ORDER — OXYCODONE HYDROCHLORIDE 5 MG/1
5 TABLET ORAL EVERY 6 HOURS PRN
Qty: 12 TABLET | Refills: 0 | Status: SHIPPED | OUTPATIENT
Start: 2025-01-12 | End: 2025-01-15

## 2025-01-12 RX ORDER — CIPROFLOXACIN 500 MG/1
500 TABLET, FILM COATED ORAL EVERY 12 HOURS
Qty: 20 TABLET | Refills: 0 | Status: SHIPPED | OUTPATIENT
Start: 2025-01-12 | End: 2025-01-22

## 2025-01-12 RX ORDER — PANTOPRAZOLE SODIUM 40 MG/1
40 TABLET, DELAYED RELEASE ORAL
Qty: 30 TABLET | Refills: 0 | Status: SHIPPED | OUTPATIENT
Start: 2025-01-13

## 2025-01-12 RX ADMIN — ACETAMINOPHEN 975 MG: 325 TABLET ORAL at 08:35

## 2025-01-12 RX ADMIN — CIPROFLOXACIN 500 MG: 500 TABLET ORAL at 09:45

## 2025-01-12 RX ADMIN — ACYCLOVIR 400 MG: 400 TABLET ORAL at 08:35

## 2025-01-12 RX ADMIN — OXYCODONE HYDROCHLORIDE 5 MG: 5 TABLET ORAL at 10:59

## 2025-01-12 RX ADMIN — PANTOPRAZOLE SODIUM 40 MG: 40 TABLET, DELAYED RELEASE ORAL at 08:35

## 2025-01-12 RX ADMIN — SERTRALINE HYDROCHLORIDE 100 MG: 100 TABLET ORAL at 08:35

## 2025-01-12 RX ADMIN — CEFEPIME HYDROCHLORIDE 2 G: 2 INJECTION, POWDER, FOR SOLUTION INTRAVENOUS at 08:34

## 2025-01-12 RX ADMIN — OXYCODONE HYDROCHLORIDE 5 MG: 5 TABLET ORAL at 00:36

## 2025-01-12 ASSESSMENT — ACTIVITIES OF DAILY LIVING (ADL)
ADLS_ACUITY_SCORE: 33

## 2025-01-12 NOTE — PLAN OF CARE
Goal Outcome Evaluation:  Patient is alert and oriented x4. Independent in the room, ambulates to the bathroom. Up in the chair during shift. Tolerates regular diet. NPO at midnight for possible procedure on 1/12.      Hand soak and dressing change completed on shift. Pain managed with scheduled tylenol. New PIV placed in left arm.     Problem: Infection  Goal: Absence of Infection Signs and Symptoms  Outcome: Progressing     Problem: Pain Acute  Goal: Optimal Pain Control and Function  Outcome: Progressing  Intervention: Prevent or Manage Pain  Recent Flowsheet Documentation  Taken 1/11/2025 1700 by Tri Silveira, RN  Medication Review/Management: medications reviewed     Problem: Wound  Goal: Optimal Functional Ability  Outcome: Progressing  Intervention: Optimize Functional Ability  Recent Flowsheet Documentation  Taken 1/11/2025 1700 by Tri Silveira, RN  Activity Management:   up in chair   ambulated to bathroom  Activity Assistance Provided: independent

## 2025-01-12 NOTE — PROGRESS NOTES
Infectious Disease Progress Note    Assessment/Plan  Cellulitis and probable abscess of the left thumb/hand   Incision and drainage of left thumb/hand abscess and excisional    Cultures with Ps aeruginosa.  No further surgery planned     PLAN  Change antibiotics to po ciprofloxacin 500 mg po bid x 10 days.    Patient cautioned about potential for tendonitis and drug interactions with divalent cations.     OK to discharge to home anytime from Infectious Disease standpoint.    We'll sign off.  Please call if questions or problems.        Active Problems:    Phlegmonous cellulitis      ENIO YODER MD  [phone]      Subjective  Feels better. Relieved no further surgery.    Objective    Vital signs in last 24 hours  Temp:  [97.5  F (36.4  C)-98  F (36.7  C)] 97.8  F (36.6  C)  Pulse:  [72-77] 72  Resp:  [18] 18  BP: (123-139)/(68-83) 123/83  SpO2:  [93 %-97 %] 94 %  Wt Readings from Last 3 Encounters:   01/06/25 102.6 kg (226 lb 3.2 oz)           Intake/Output last 3 shifts  I/O last 3 completed shifts:  In: 1630 [P.O.:1280; I.V.:350]  Out: -   Intake/Output this shift:  No intake/output data recorded.    Review of Systems   Pertinent items are noted in HPI., otherwise negative.    Physical Exam    Gen. appearance nontoxic  Eyes no conjunctivitis or icterus  Neck no stiffness   Heart  No edema  Lungs breathing comfortably  Abdomen soft not tender  Extremities no synovitis, trace edema  Hand wrapped  Skin  no rash or emboli  Neurologic alert oriented no focal deficits    Pertinent Labs   Lab Results: personally reviewed.     Recent Labs   Lab 01/12/25  0514 01/11/25  0509 01/10/25  0557   WBC 4.8 4.5 5.4   HGB 11.3* 11.4* 11.0*   HCT 33.4* 34.0* 32.2*    307 282        Recent Labs   Lab 01/12/25 0514 01/11/25  0509 01/10/25  0557    143 141   CO2 28 27 25   BUN 7.0* 6.8* 7.1*     Creatinine   Date Value Ref Range Status   01/12/2025 0.66 0.51 - 0.95 mg/dL Final     7-Day Micro Results        Collected Updated Procedure Result Status      01/09/2025 1652 01/11/2025 2146 Anaerobic Bacterial Culture Routine [[id number]]    Tissue from Finger, Left    Preliminary result Component Value   Culture No anaerobic organisms isolated after 2 days  [P]                01/09/2025 1652 01/09/2025 2239 Gram Stain [[id number]]   Tissue from Finger, Left    Final result Component Value   GS Culture See corresponding culture for results   Gram Stain Result No organisms seen   Gram Stain Result 1+ WBC seen            01/09/2025 1652 01/11/2025 2146 Fungal or Yeast Culture Routine [[id number]]   Tissue from Finger, Left    Preliminary result Component Value   Culture No growth after 2 days  [P]                01/09/2025 1652 01/12/2025 0721 Tissue Aerobic Bacterial Culture Routine [[id number]]    Tissue from Finger, Left    Preliminary result Component Value   Culture No growth after 2 days  [P]                01/09/2025 1650 01/11/2025 2201 Anaerobic Bacterial Culture Routine [[id number]]    Tissue from Finger, Left    Preliminary result Component Value   Culture No anaerobic organisms isolated after 2 days  [P]                01/09/2025 1650 01/09/2025 2235 Gram Stain [[id number]]   Tissue from Finger, Left    Final result Component Value   GS Culture See corresponding culture for results   Gram Stain Result No organisms seen   Gram Stain Result No white blood cells seen            01/09/2025 1650 01/11/2025 2201 Fungal or Yeast Culture Routine [[id number]]   Tissue from Finger, Left    Preliminary result Component Value   Culture No growth after 2 days  [P]                01/09/2025 1650 01/11/2025 1129 Tissue Aerobic Bacterial Culture Routine [[id number]]    (Abnormal)   Tissue from Finger, Left    Preliminary result Component Value   Culture Culture in progress  [P]     1+ Pseudomonas aeruginosa  [P]     Susceptibilities done on previous cultures               01/07/2025 1807 01/11/2025 2146 Anaerobic  Bacterial Culture Routine [[id number]]   Swab from Finger, Left    Preliminary result Component Value   Culture No anaerobic organisms isolated after 4 days  [P]                01/07/2025 1807 01/07/2025 2211 Gram Stain [[id number]]   Swab from Finger, Left    Final result Component Value   GS Culture See corresponding culture for results   Gram Stain Result No organisms seen   Gram Stain Result 3+ WBC seen   Predominantly PMNs            01/07/2025 1807 01/09/2025 1014 Swab Aerobic Bacterial Culture Routine [[id number]]   (Abnormal)   Swab from Finger, Left    Final result Component Value   Culture 1+ Pseudomonas aeruginosa    Susceptibilities done on previous cultures               01/07/2025 1805 01/11/2025 2146 Anaerobic Bacterial Culture Routine [[id number]]   Swab from Finger, Left    Preliminary result Component Value   Culture No anaerobic organisms isolated after 4 days  [P]                01/07/2025 1805 01/07/2025 2217 Gram Stain [[id number]]   (Abnormal)   Swab from Finger, Left    Final result Component Value   GS Culture See corresponding culture for results   Gram Stain Result 2+ Gram positive cocci   Gram Stain Result 4+ WBC seen   Predominantly PMNs            01/07/2025 1805 01/10/2025 0308 Swab Aerobic Bacterial Culture Routine [[id number]]    (Abnormal)   Swab from Finger, Left    Final result Component Value   Culture 3+ Pseudomonas aeruginosa        Susceptibility        Pseudomonas aeruginosa      FILI      Cefepime 2 ug/mL Susceptible      Ceftazidime 2 ug/mL Susceptible      Ciprofloxacin <=0.06 ug/mL Susceptible      Levofloxacin <=0.12 ug/mL Susceptible      Meropenem 0.5 ug/mL Susceptible      Piperacillin/Tazobactam 8 ug/mL Susceptible                           01/06/2025 1717 01/11/2025 2132 Blood Culture Peripheral Blood [[id number]]   Peripheral Blood    Final result Component Value   Culture No Growth               01/06/2025 1707 01/11/2025 2132 Blood Culture Line,  "venous [[id number]]   Blood from Line, venous    Final result Component Value   Culture No Growth                       Pertinent Radiology   Radiology Results:   POC US Guidance Needle Placement    Result Date: 1/9/2025  Ultrasound was performed as guidance to an anesthesia procedure.  Click \"PACS images\" hyperlink below to view any stored images.  For specific procedure details, view procedure note authored by anesthesia.                     "

## 2025-01-12 NOTE — PLAN OF CARE
Problem: Adult Inpatient Plan of Care  Goal: Optimal Comfort and Wellbeing  Intervention: Monitor Pain and Promote Comfort  Recent Flowsheet Documentation  Taken 1/12/2025 0036 by Lyla Webster RN  Pain Management Interventions:   medication (see MAR)   pillow support provided     Problem: Pain Acute  Goal: Optimal Pain Control and Function  Outcome: Progressing  Intervention: Develop Pain Management Plan  Recent Flowsheet Documentation  Taken 1/12/2025 0036 by Lyla Webtser, RN  Pain Management Interventions:   medication (see MAR)   pillow support provided     Problem: Wound  Goal: Optimal Functional Ability  Outcome: Progressing   Goal Outcome Evaluation:    POD 3: I & D left hand cellulitis and abscess    NPO since 12 am for possible left hand I & D today. Reported moderate pain to left hand post surgical. PRN oxycodone 5 mg given. Dressing with ace bandage per plan of care. Independent in room. Non-weight bearing LUE.

## 2025-01-12 NOTE — DISCHARGE SUMMARY
"[facility]  Discharge Summary - Medicine & Pediatrics       Date of Admission:  1/6/2025  Date of Discharge:  1/12/2025  1:13 PM  Discharging Provider: Dr. Tilley, Dr. Garza  Discharge Service: Hospitalist Service    Discharge Diagnoses   L First digit MCP Phlegmon  L 1st digit abscess and cellullitis    Clinically Significant Risk Factors     # Obesity: Estimated body mass index is 37.64 kg/m  as calculated from the following:    Height as of this encounter: 1.651 m (5' 5\").    Weight as of this encounter: 102.6 kg (226 lb 3.2 oz).       Follow-ups Needed After Discharge   Follow-up Appointments       Follow Up Care      Follow-up with your Dr. Gomez at [facility] in 5-7 days after discharge.  Please call [phone] to schedule this.                Unresulted Labs Ordered in the Past 30 Days of this Admission       Date and Time Order Name Status Description    1/9/2025  5:00 PM Tissue Aerobic Bacterial Culture Routine Preliminary     1/9/2025  5:00 PM Fungal or Yeast Culture Routine Preliminary     1/9/2025  5:00 PM Anaerobic Bacterial Culture Routine Preliminary     1/9/2025  4:54 PM Fungal or Yeast Culture Routine Preliminary     1/9/2025  4:54 PM Anaerobic Bacterial Culture Routine Preliminary     1/7/2025  6:07 PM Anaerobic Bacterial Culture Routine Preliminary     1/7/2025  6:07 PM Anaerobic Bacterial Culture Routine Preliminary         These results will be followed up by PCP    Discharge Disposition   Discharged to home  Condition at discharge: Stable    Hospital Course   Arlene Dominguez was admitted on 1/6/2025 for LUE 1st digit erythema and edema noted to be a L 1st digit phlegmon w/abscess and cellulitis .  The following problems were addressed during her hospitalization:    Left first digit MCP phlegmon    Left first digit abscess and cellulitis  Patient presented for 2 days of worsening pain, edema, and erythema on the volar aspect of the left first digit. Had " I&D w/ortho on 1/7 and then again on 1/9. More necrotic tissue removed second time and packed. Purulent drainage from initial I&D culture was significant for pseudomonas. Empirically started on vanc+ceftriaxone, but was then switched to cefepime 1/10. She remains afebrile, WBC normalized on discharge. Pain is manageable on current regimen. Started on 500 mg Ciprofloxacin BID x10 days at discharge per recommendation of ID.    -Ciprofloxacin 500 mg BID x10 days at discharge  -F/up with outpatient orthopedic surgery in 2 weeks following discharge  -Pain regimen:              -sent oxycodone 5 mg x12 tablets at discharge  -Hospital follow up w/PCP within 2 weeks                 Depression  Insomnia   - Continue PTA sertraline and trazodone at discharge     Chronic conditions  - GERD: PTA omeprazole  - HSV: PTA acyclovir     Consultations This Hospital Stay   PHARMACY TO DOSE VANCO  PHYSICAL THERAPY ADULT IP CONSULT  OCCUPATIONAL THERAPY ADULT IP CONSULT  CARE MANAGEMENT / SOCIAL WORK IP CONSULT  PHARMACY TO DOSE VANCO  ORTHOPEDIC SURGERY IP CONSULT  INFECTIOUS DISEASES IP CONSULT    Code Status   Full Code       The patient was discussed with Dr. Greg Tilley MD  [place] [id number]  Phone: [phone]  Fax: [phone]  ______________________________________________________________________    Physical Exam   Vital Signs: Temp: 97.8  F (36.6  C) Temp src: Oral BP: 123/83 Pulse: 72   Resp: 18 SpO2: 94 % O2 Device: None (Room air)    Weight: 226 lbs 3.2 oz    Constitutional:       General: She is not in acute distress.     Appearance: Normal appearance.   HENT:      Head: Normocephalic and atraumatic.   Eyes:      General: No scleral icterus.  Cardiovascular:      Pulses: Normal pulses.      Heart sounds: Normal heart sounds.   Pulmonary:      Effort: Pulmonary effort is normal.   Musculoskeletal:      Comments: SUE JIMENEZ  "grossly intact. Bandaged apppropriately. No open sore on superficial exam.   Skin:     Comments: LUE - streaking resolved   Neurological:      General: No focal deficit present.      Mental Status: She is alert and oriented to person, place, and time.       Primary Care Physician   Lauren Bowens    Discharge Orders      Reason for your hospital stay    Left thumb I&D     When to call - Contact Surgeon Team    You may experience symptoms that require follow-up before your scheduled appointment. Refer to the \"Stoplight Tool\" for instructions on when to contact your Surgeon Team if you are concerned about pain control, blood clots, constipation, or if you are unable to urinate.     When to call - Reach out to Urgent Care    If you are not able to reach your Surgeon Team and you need immediate care, go to the Orthopedic Walk-in Clinic or Urgent Care at your Surgeon's office.  Do NOT go to the Emergency Room unless you have shortness of breath, chest pain, or other signs of a medical emergency.     When to call - Reasons to Call 911    Call 911 immediately if you experience sudden-onset chest pain, arm weakness/numbness, slurred speech, or shortness of breath     Discharge Instruction - Breathing exercises    Perform breathing exercises 10 times per hours while awake for 2 weeks. (If given, use your Incentive Spirometer)     Symptoms - Fever Management    A low grade fever can be expected after surgery.  Use acetaminophen (TYLENOL) as needed for fever management.  Contact your Surgeon Team if you have a fever greater than 101.5 F, chills, and/or night sweats.     Symptoms - Constipation management    Constipation (hard, dry bowel movements) is expected after surgery due to the combination of being less active, the anesthetic, and the opioid pain medication.  You can do the following to help reduce constipation:  ~  FLUIDS:  Drink clear liquids (water or Gatorade), or juice (apple/prune).  ~  DIET:  Eat a fiber rich diet. "    ~  ACTIVITY:  Get up and move around several times a day.  Increase your activity as you are able.  MEDICATIONS:  Reduce the risk of constipation by starting medications before you are constipated.  You can take Miralax   (1 packet as directed) and/or a stool softener (Senokot 1-2 tablets 1-2 times a day).  If you already have constipation and these medications are not working, you can get magnesium citrate and use as directed.  If you continue to have constipation you can try an over the counter suppository or enema.  Call your Surgeon Team if it has been greater than 3 days since your last bowel movement.     Symptoms - Reduced Urine Output    Changes in the amount of fluids you drank before and after surgery may result in problems urinating.  It is important to stay well-hydrated after surgery and drink plenty of water. If it has been greater than 8 hours since you have urinated despite drinking plenty of water, call your Surgeon Team.     Activity - Exercises to prevent blood clots    Unless otherwise directed by your Surgeon team, perform the following exercises at least three times per day for the first four weeks after surgery to prevent blood clots in your legs: 1) Point and flex your feet (Ankle Pumps), 2) Move your ankle around in big circles, 3) Wiggle your toes, 4) Walk, even for short distances, several times a day, will help decrease the risk of blood clots.     Comfort and Pain Management - Pain after Surgery    Pain after surgery is normal and expected.  You will have some amount of pain for several weeks after surgery.  Your pain will improve with time.  There are several things you can do to help reduce your pain including: rest, ice, elevation, and using pain medications as needed. Contact your Surgeon Team if you have pain that persists or worsens after surgery despite rest, ice, elevation, and taking your medication(s) as prescribed. Contact your Surgeon Team if you have new numbness,  tingling, or weakness in your operative extremity.     Comfort and Pain Management - Swelling after Surgery    Swelling and/or bruising of the surgical extremity is common and may persist for several months after surgery. In addition to frequent icing and elevation, gentle compressive support with an ACE wrap or tubigrip may help with swelling. Apply compression regularly, removing at least twice daily to perform skin checks. Contact your Surgeon Team if your swelling increases and is NOT associated with an increase in your activity level, or if your swelling increases and is associated with redness and pain.     Comfort and Pain Management - Cold therapy    Ice can be used to control swelling and discomfort after surgery. Place a thin towel over your operative site and apply the ice pack overtop. Leave ice pack in place for 20 minutes, then remove for 20 minutes. Repeat this 20 minutes on/20 minutes off routine as often as tolerated.     Medication Instructions - Acetaminophen (TYLENOL) Instructions    You were discharged with acetaminophen (TYLENOL) for pain management after surgery. Acetaminophen most effectively manages pain symptoms when it is taken on a schedule without missing doses (every four, six, or eight hours). Your Provider will prescribe a safe daily dose between 3000 - 4000 mg.  Do NOT exceed this daily dose. Most patients use acetaminophen for pain control for the first four weeks after surgery.  You can wean from this medication as your pain decreases.     Medication Instructions - NSAID Instructions    You were discharged with an anti-inflammatory medication for pain management to use in combination with acetaminophen (TYLENOL) and the narcotic pain medication.  Take this medication exactly as directed.  You should only take one anti-inflammatory at a time.  Some common anti-inflammatories include: ibuprofen (ADVIL, MOTRIN), naproxen (ALEVE, NAPROSYN), celecoxib (CELEBREX), meloxicam (MOBIC),  ketorolac (TORADOL).  Take this medication with food and water.     Follow Up Care    Follow-up with your Dr. Gomez at [facility] in 5-7 days after discharge.  Please call [phone] to schedule this.     Medication instructions - No pharmacologic VTE prophylaxis prescribed    Your Surgeon did not prescribe medication for anticoagulation.     Comfort and Pain Management - UPPER extremity Elevation    Swelling is expected for several months after surgery. This type of swelling is usually associated with gravity and activity, and can be improved with elevation.   The best way to do this is to get your hand above your heart by sitting down, resting your elbow on a pillow or arm rest, with your hand in the air. Perform this elevation as often as possible especially for the first two weeks after surgery     Opioid Instructions (Less than 65 years)    You were discharged with an opioid medication (hydromorphone, oxycodone, hydrocodone, or tramadol). This medication should only be taken for breakthrough pain that is not controlled with acetaminophen (TYLENOL). If you rate your pain less than 3 you do not need this medication. Pain rating 0-3: You do not need this medication. Pain rating 4-6: Take 1 tablet every 4-6 hours as needed Pain rating 7-10: Take 2 tablets every 4-6 hours as needed. Do not exceed 6 tablets per day     Medication Instructions - Opioids - Tapering Instructions    In the first three days following surgery, your symptoms may warrant use of the narcotic pain medication every four to six hours as prescribed. This is normal. As your pain symptoms improve, focus your efforts on decreasing (tapering) use of narcotic medications. The most successful tapering strategy is to first, decrease the number of tablets you take every 4-6 hours to the minimum prescribed. Then, increase the amount of time between doses. For example: First, taper to   or 1 tablet every 4-6 hours. Then, taper to   or 1 tablet  every 6-8 hours. Then, taper to   or 1 tablet every 8-10 hours. Then, taper to   or 1 tablet every 10-12 hours. Then, taper to   or 1 tablet at bedtime. The bedtime dose can help with comfort during sleep and is typically the last dose to be discontinued after surgery.     Return to Driving    Return to driving - Driving is NOT permitted until directed by your provider. Under no circumstance are you permitted to drive while using narcotic pain medications.     Dressing / Wound Care - Wound    You have a clean dressing on your surgical wound. Dressing change instructions as follows: warm water soapy soaks 2-3 times a day.place new dressing after each soak. Contact your Surgeon Team if you have increased redness, warmth around the surgical wound, and/or drainage from the surgical wound.     Dressing / Wound Care - NO Tub Bathing    Tub bathing, swimming, or any other activities that will cause your incision to be submerged in water should be avoided until allowed by your Surgeon.     Dressing Wound Care - Shower with wound/dressing NOT covered    You do not need to cover your dressing or incision in the shower, you may allow water and soap to run over top of the surgical dressing or incision. You may shower 5 days after surgery.     No VTE Prophylaxis     Discharge Instruction - Regular Diet Adult    Return to your pre-surgery diet unless instructed otherwise       Significant Results and Procedures   Most Recent 3 CBC's:  Recent Labs   Lab Test 01/12/25  0514 01/11/25  0509 01/10/25  0557   WBC 4.8 4.5 5.4   HGB 11.3* 11.4* 11.0*   MCV 93 94 92    307 282     Most Recent 3 BMP's:  Recent Labs   Lab Test 01/12/25  0514 01/11/25  0642 01/11/25  0509 01/10/25  0557     --  143 141   POTASSIUM 3.8  --  3.8 3.7   CHLORIDE 106  --  106 105   CO2 28  --  27 25   BUN 7.0*  --  6.8* 7.1*   CR 0.66  --  0.69 0.62   ANIONGAP 9  --  10 11   RAFAT 9.5  --  9.3 9.1   GLC 83 100* 91 87     7-Day Micro Results        Collected Updated Procedure Result Status      01/09/2025 1652 01/11/2025 2146 Anaerobic Bacterial Culture Routine [[id number]]    Tissue from Finger, Left    Preliminary result Component Value   Culture No anaerobic organisms isolated after 2 days  [P]                01/09/2025 1652 01/09/2025 2239 Gram Stain [[id number]]   Tissue from Finger, Left    Final result Component Value   GS Culture See corresponding culture for results   Gram Stain Result No organisms seen   Gram Stain Result 1+ WBC seen            01/09/2025 1652 01/11/2025 2146 Fungal or Yeast Culture Routine [[id number]]   Tissue from Finger, Left    Preliminary result Component Value   Culture No growth after 2 days  [P]                01/09/2025 1652 01/12/2025 0721 Tissue Aerobic Bacterial Culture Routine [[id number]]    Tissue from Finger, Left    Preliminary result Component Value   Culture No growth after 2 days  [P]                01/09/2025 1650 01/11/2025 2201 Anaerobic Bacterial Culture Routine [[id number]]    Tissue from Finger, Left    Preliminary result Component Value   Culture No anaerobic organisms isolated after 2 days  [P]                01/09/2025 1650 01/09/2025 2235 Gram Stain [[id number]]   Tissue from Finger, Left    Final result Component Value   GS Culture See corresponding culture for results   Gram Stain Result No organisms seen   Gram Stain Result No white blood cells seen            01/09/2025 1650 01/11/2025 2201 Fungal or Yeast Culture Routine [[id number]]   Tissue from Finger, Left    Preliminary result Component Value   Culture No growth after 2 days  [P]                01/09/2025 1650 01/12/2025 0955 Tissue Aerobic Bacterial Culture Routine [[id number]]    (Abnormal)   Tissue from Finger, Left    Final result Component Value   Culture 1+ Pseudomonas aeruginosa    Susceptibilities done on previous cultures               01/07/2025 1807 01/11/2025 2146 Anaerobic Bacterial Culture Routine [[id number]]    Swab from Finger, Left    Preliminary result Component Value   Culture No anaerobic organisms isolated after 4 days  [P]                01/07/2025 1807 01/07/2025 2211 Gram Stain [[id number]]   Swab from Finger, Left    Final result Component Value   GS Culture See corresponding culture for results   Gram Stain Result No organisms seen   Gram Stain Result 3+ WBC seen   Predominantly PMNs            01/07/2025 1807 01/09/2025 1014 Swab Aerobic Bacterial Culture Routine [[id number]]   (Abnormal)   Swab from Finger, Left    Final result Component Value   Culture 1+ Pseudomonas aeruginosa    Susceptibilities done on previous cultures               01/07/2025 1805 01/11/2025 2146 Anaerobic Bacterial Culture Routine [[id number]]   Swab from Finger, Left    Preliminary result Component Value   Culture No anaerobic organisms isolated after 4 days  [P]                01/07/2025 1805 01/07/2025 2217 Gram Stain [[id number]]   (Abnormal)   Swab from Finger, Left    Final result Component Value   GS Culture See corresponding culture for results   Gram Stain Result 2+ Gram positive cocci   Gram Stain Result 4+ WBC seen   Predominantly PMNs            01/07/2025 1805 01/10/2025 0308 Swab Aerobic Bacterial Culture Routine [[id number]]    (Abnormal)   Swab from Finger, Left    Final result Component Value   Culture 3+ Pseudomonas aeruginosa        Susceptibility        Pseudomonas aeruginosa      FILI      Cefepime 2 ug/mL Susceptible      Ceftazidime 2 ug/mL Susceptible      Ciprofloxacin <=0.06 ug/mL Susceptible      Levofloxacin <=0.12 ug/mL Susceptible      Meropenem 0.5 ug/mL Susceptible      Piperacillin/Tazobactam 8 ug/mL Susceptible                           01/06/2025 1717 01/11/2025 2132 Blood Culture Peripheral Blood [[id number]]   Peripheral Blood    Final result Component Value   Culture No Growth               01/06/2025 1707 01/11/2025 2132 Blood Culture Line, venous [[id number]]   Blood from Line,  venous    Final result Component Value   Culture No Growth                   ,   Results for orders placed or performed during the hospital encounter of 01/06/25   MR Hand Left w/o & w Contrast    Narrative    EXAM: MR HAND LEFT W/O and W CONTRAST  LOCATION: [facility]  DATE: 1/7/2025    INDICATION: Left first digit pain, edema, and erythema.  COMPARISON: None.  TECHNIQUE: Routine. Additional postgadolinium T1 sequences were obtained.  IV CONTRAST: 10ml Gadavist    FINDINGS:     TENDONS:   -Extensor tendons: No tendon tear, tendinopathy, or tenosynovitis.  -Flexor tendons: No tendinous tear, tendinopathy, or tenosynovitis.    LIGAMENTS:  -Visualized collateral and capsular ligaments: Normal.    JOINTS AND BONES:   -Mild degenerative changes of the first carpometacarpal joint with associated subchondral edema and tiny subchondral cystic change.  -No significant joint effusion.  -No evidence of acute fracture or dislocation.  -No T1 hypointense marrow replacement. No other marrow edema. No abnormal enhancement.    MUSCLES AND SOFT TISSUES:   -In the subcutaneous fat volar to the first MCP joint, there is a focal nonenhancing area demonstrating T1 hypointensity and T2/STIR intermediate signal. This measures 2.5 x 2.0 x 0.9 cm (series 9, image 25; series 10, image 17). Extensive surrounding   subcutaneous edema and enhancement.  -Circumferential subcutaneous edema of the first digit. Subcutaneous edema of the dorsum of the hand.  -No rim-enhancing fluid collection.  -Minimal edema and enhancement involving the thenar musculature, likely reactive.      Impression    IMPRESSION:  1.  Focal 2.5 cm nonenhancing area within the subcutaneous fat volar to the first MCP joint, with extensive adjacent subcutaneous edema and enhancement, suspicious for phlegmon and cellulitis. No discrete abscess.    2.  No tenosynovitis. No evidence of osteomyelitis.   POC US Guidance Needle Placement    Narrative     "Ultrasound was performed as guidance to an anesthesia procedure.  Click   \"PACS images\" hyperlink below to view any stored images.  For specific   procedure details, view procedure note authored by anesthesia.   US Upper Extremity Venous Duplex Bilat    Narrative    EXAM: US UPPER EXTREMITY VENOUS DUPLEX BILATERAL  LOCATION: [facility]  DATE: 1/8/2025    INDICATION: LUE swelling, r o DVT  COMPARISON: MRI of the left hand 1/6/2025  TECHNIQUE: Venous Duplex ultrasound of both upper extremities with (when possible) and without compression, augmentation, and duplex. Color flow and spectral Doppler with waveform analysis performed.    FINDINGS: Ultrasound includes evaluation of the internal jugular veins, innominate veins, subclavian veins, axillary veins, and brachial veins. The superficial cephalic and basilic veins were also evaluated where seen.    RIGHT: No deep venous thrombosis. At the wrist, the ulnar and radial veins are seen with color overlay and are diminutive. Cephalic vein is not identified. No superficial thrombophlebitis.    LEFT: No deep venous thrombosis. At the wrist, the ulnar and radial veins are diminutive and not  seen even with color overlay. Cephalic vein is not identified. No superficial thrombophlebitis.       Impression    IMPRESSION:  1.  No deep venous thrombosis in either arm.  2.  At the wrist, ulnar and radial veins are diminutive and those on the right are only seen with color and not seen at all on the left.  3.  Neither cephalic veins are identified.    POC US Guidance Needle Placement    Narrative    Ultrasound was performed as guidance to an anesthesia procedure.  Click   \"PACS images\" hyperlink below to view any stored images.  For specific   procedure details, view procedure note authored by anesthesia.       Discharge Medications   Discharge Medication List as of 1/12/2025 12:23 PM        START taking these medications    Details   acetaminophen (TYLENOL) " 325 MG tablet Take 3 tablets (975 mg) by mouth 3 times daily., Disp-100 tablet, R-0, E-Prescribe      aspirin-acetaminophen-caffeine (EXCEDRIN MIGRAINE) 250-250-65 MG tablet Take 1 tablet by mouth daily as needed for headaches., Disp-30 tablet, R-0, E-Prescribe      ciprofloxacin (CIPRO) 500 MG tablet Take 1 tablet (500 mg) by mouth every 12 hours for 10 days., Disp-20 tablet, R-0, E-Prescribe      oxyCODONE (ROXICODONE) 5 MG tablet Take 1 tablet (5 mg) by mouth every 6 hours as needed for pain., Disp-12 tablet, R-0, E-Prescribe      pantoprazole (PROTONIX) 40 MG EC tablet Take 1 tablet (40 mg) by mouth every morning (before breakfast)., Disp-30 tablet, R-0, E-Prescribe      senna-docusate (SENOKOT-S/PERICOLACE) 8.6-50 MG tablet Take 1 tablet by mouth 2 times daily as needed for constipation., Disp-30 tablet, R-0, E-Prescribe           CONTINUE these medications which have NOT CHANGED    Details   acyclovir (ZOVIRAX) 400 MG tablet Take 400 mg by mouth daily., Historical      omeprazole (PRILOSEC) 20 MG DR capsule Take 20 mg by mouth daily., Historical      sertraline (ZOLOFT) 100 MG tablet Take 1 tablet by mouth daily., Historical      sodium chloride (OCEAN) 0.65 % nasal spray Spray 1 spray into both nostrils daily as needed for congestion., Historical      traZODone (DESYREL) 100 MG tablet Take 100 mg by mouth at bedtime., Historical           Allergies   No Known Allergies    Schuyler Tilley MD  PGY-2  [facility]  [facility]  January 12, 2025

## 2025-01-12 NOTE — PROGRESS NOTES
Care Management Discharge Note    Discharge Date: 01/12/2025       Discharge Disposition: Home    Discharge Services: None    Discharge DME: None    Discharge Transportation: family or friend will provide    Private pay costs discussed: Not applicable    Does the patient's insurance plan have a 3 day qualifying hospital stay waiver?  No    PAS Confirmation Code: NA  Patient/family educated on Medicare website which has current facility and service quality ratings: no    Education Provided on the Discharge Plan: Yes  Persons Notified of Discharge Plans: Pt  Patient/Family in Agreement with the Plan: yes    Handoff Referral Completed: No, handoff not indicated or clinically appropriate    Additional Information:  IV abx switched to oral. Pt to discharge home with family. No requested or anticipated CM needs at time of discharge.     MELI MillerW

## 2025-01-12 NOTE — PLAN OF CARE
Problem: Adult Inpatient Plan of Care  Goal: Plan of Care Review  Description: The Plan of Care Review/Shift note should be completed every shift.  The Outcome Evaluation is a brief statement about your assessment that the patient is improving, declining, or no change.  This information will be displayed automatically on your shift  note.  Outcome: Progressing  Flowsheets (Taken 1/12/2025 0948)  Plan of Care Reviewed With: patient   Goal Outcome Evaluation:plan for home today  Problem: Pain Acute  Goal: Optimal Pain Control and Function  Outcome: Progressing  Intervention: Develop Pain Management Plan  Recent Flowsheet Documentation  Taken 1/12/2025 0833 by Allie Arredondo, RN  Pain Management Interventions: medication (see MAR)-understands pain scale and medicate as per mar  Problem: Wound  Goal: Optimal Pain Control and Function  Intervention: Prevent or Manage Pain  Recent Flowsheet Documentation  Taken 1/12/2025 0833 by Allie Arredondo, RN  Pain Management Interventions: medication (see MAR)-bandage changes as per orders    Plan of Care Reviewed With: patient alert and oriented and able to make needs known. IV antibiotics switched to oral for discharge.  Bandage changes as per orders after wound soaks.  Will go home today

## 2025-01-12 NOTE — PROGRESS NOTES
"Orthopedic Progress Note      Assessment: 3 Days Post-Op  S/P Procedure(s):  Incision and drainage of left thumb/hand abscess and  excisional debridement of the wound @ [facility]    Infection appears to be improving. NO evidence of abscess orflexor tenoisynovitis.     Plan:   - Continue PT/OT  - Weightbearing status: Nonweightbearing left upper extremity. Range of motion as tolerated  - Anticoagulation:Anticoagulation per medical service. OK to anticoagulate per medical team. SCDs, navin stockings and early ambulation.  -Antibiotics: Per ID, appreciate recs. IV vanco and cefepime now.   - Cultures show P. Aeruginosa  -Diet: Patient may return to regular diet today.   - Dressing: Remove dressing TID for warm soapy water soaks, 20 minutes each. Replace with gauze dressing - non-adherent on the incision, bulky gauze, ACE wrap   - Elevation: Elevate LUE on pillow to keep above the level of the heart as much as possible  - Follow-up: Outpatient follow up in 2 weeks from surgery  - Disposition: Would be able to discharge home on oral antibiotics from orthopedic standpoint.       Subjective:  Pain: Improved from yesterday  Chest pain, SOB: No  Nausea, Vomiting:  No  Lightheadedness, Dizziness:  No  Neuro:  Patient denies new onset numbness or paresthesias    Patient reports that her hand feels better today than yesterday. She has continued the soaks. Denies fevers or chills.     Objective:  /68 (BP Location: Left arm)   Pulse 77   Temp 98  F (36.7  C) (Oral)   Resp 18   Ht 1.651 m (5' 5\")   Wt 102.6 kg (226 lb 3.2 oz)   SpO2 93%   BMI 37.64 kg/m      The patient is A&Ox3. Appears comfortable.   Sensation is intact. Sensation to light touch intact along the radial and ulnar border of the thumb although slightly decreased on radial side of the thumb.   Palpable radial pulse.   Wound over thenar eminence. There are nylon suture in place. Granulation tissue present on the distal end of the wound.   Thenar eminence " "is swollen and mildly erythematous. Erythema appears improved from yesterday.  - NO significnat pain over the flexor tendon sheath. Mild incisional pain with gentle extension.   Mild bleeding from incision.   No purulent drainage from wound  No erythema or evidence of infection and remainder of hand.      Pertinent Labs   Lab Results: personally reviewed.   No results found for: \"INR\", \"PROTIME\"  Lab Results   Component Value Date    WBC 4.8 01/12/2025    HGB 11.3 (L) 01/12/2025    HCT 33.4 (L) 01/12/2025    MCV 93 01/12/2025     01/12/2025     Lab Results   Component Value Date     01/12/2025    CO2 28 01/12/2025         Report completed by:  Luisa Aguilar PA-C/[facility]. [facility]    Date: 1/12/2025  Time: 7:32 AM        I have seen and evaluated the patient.  I agree with the note written by LY Colvin.  I have edited the above to reflect our findings.  Signed,  iRcky Luna MD      "

## 2025-01-12 NOTE — PROGRESS NOTES
Pt discharged to home at approximately 1315 via personal vehicle accompanied by her  with all belongings in tow. Discharge instructions were reviewed and all questions were answered. Medications signed for and sent home with patient. Dressing supplies sent with patient.

## 2025-01-14 ENCOUNTER — PATIENT OUTREACH (OUTPATIENT)
Dept: CARE COORDINATION | Facility: CLINIC | Age: 60
End: 2025-01-14
Payer: COMMERCIAL

## 2025-01-14 LAB
BACTERIA SPEC CULT: NORMAL
BACTERIA SPEC CULT: NORMAL
BACTERIA TISS BX CULT: NO GROWTH

## 2025-01-14 NOTE — PROGRESS NOTES
Clinic Care Coordination Contact  [facility]    Clinical Data: Care Coordinator Outreach    Outreach Documentation Number of Outreach Attempt   1/14/2025   9:59 AM 2       Unable to leave a message due to: Voicemail is full.      Care Coordinator will do no further outreaches at this time.    Niki Hi  [phone]  Care

## 2025-01-16 LAB
BACTERIA TISS BX CULT: NORMAL

## 2025-01-23 LAB
BACTERIA TISS BX CULT: NORMAL
BACTERIA TISS BX CULT: NORMAL

## 2025-01-30 ENCOUNTER — TRANSFERRED RECORDS (OUTPATIENT)
Dept: HEALTH INFORMATION MANAGEMENT | Facility: CLINIC | Age: 60
End: 2025-01-30
Payer: COMMERCIAL

## 2025-01-30 LAB
BACTERIA TISS BX CULT: NORMAL
BACTERIA TISS BX CULT: NORMAL

## 2025-02-06 LAB
BACTERIA TISS BX CULT: NO GROWTH
BACTERIA TISS BX CULT: NO GROWTH

## 2025-02-16 ENCOUNTER — HEALTH MAINTENANCE LETTER (OUTPATIENT)
Age: 60
End: 2025-02-16

## 2025-02-20 ENCOUNTER — TRANSFERRED RECORDS (OUTPATIENT)
Dept: HEALTH INFORMATION MANAGEMENT | Facility: CLINIC | Age: 60
End: 2025-02-20
Payer: COMMERCIAL

## (undated) DEVICE — DRSG ABD TNDRSRB WET PRUF 8IN X 10IN STRL  9194A

## (undated) DEVICE — GOWN LG DISP 9515

## (undated) DEVICE — TUBING IRRIG TUR Y TYPE 96" LF 6543-01

## (undated) DEVICE — GLOVE UNDER INDICATOR PI SZ 6.5 LF 41665

## (undated) DEVICE — TRAY PREP DRY SKIN SCRUB 067

## (undated) DEVICE — PACKING IODOFORM STRIP 1/4" 7831

## (undated) DEVICE — SUCTION MANIFOLD NEPTUNE 2 SYS 1 PORT 702-025-000

## (undated) DEVICE — GLOVE BIOGEL PI ULTRATOUCH G SZ 6.5 42165

## (undated) DEVICE — SU ETHILON 4-0 P-3 18" BLACK 699G

## (undated) DEVICE — GOWN XLG DISP 9545

## (undated) DEVICE — SU ETHILON 4-0 FS-2 18" 662G

## (undated) DEVICE — PREP POVIDONE-IODINE 10% SOLUTION 4OZ BOTTLE MDS093944

## (undated) DEVICE — CUSTOM PACK UPPER EXTREMITY SOP5BUEHEC

## (undated) DEVICE — ESU CORD BIPOLAR 12' E0512

## (undated) DEVICE — Device

## (undated) DEVICE — GLOVE BIOGEL PI SZ 6.0 40860

## (undated) DEVICE — GLOVE SURGEON PI ORTHO SZ 6-1/2 LF

## (undated) DEVICE — BNDG ELASTIC 3"X5YDS UNSTERILE 6611-30

## (undated) DEVICE — GLOVE SURG PI ULTRA TOUCH M SZ 7 LF 42670

## (undated) DEVICE — PADDING CAST 3IN WEBRIL STRL 2394

## (undated) DEVICE — DRSG GAUZE 4X4" 3033

## (undated) DEVICE — SYR 10ML LL W/O NDL 302995

## (undated) DEVICE — DRSG GAUZE 4X4" TRAY 6939

## (undated) DEVICE — GLOVE BIOGEL PI ULTRATOUCH G SZ 7.0 42170

## (undated) DEVICE — CUFF TOURN 18IN STRL DISP

## (undated) DEVICE — BNDG ELASTIC 2"X5YDS UNSTERILE 6611-20

## (undated) DEVICE — DRAPE STOCKINETTE 4" 8544

## (undated) DEVICE — DRSG KERLIX 2 1/4"X3YDS ROLL 6720

## (undated) DEVICE — SU ETHILON 4-0 P-3 18" CLR  691G

## (undated) DEVICE — SLING ARM FOAM L 0814-0794

## (undated) DEVICE — SOL NACL 0.9% IRRIG 1000ML BOTTLE 2F7124

## (undated) DEVICE — SOL WATER IRRIG 1000ML BOTTLE 2F7114

## (undated) DEVICE — GLOVE UNDER INDICATOR PI SZ 7.0 LF 41670

## (undated) DEVICE — PREP POVIDONE-IODINE 7.5% SCRUB 4OZ BOTTLE MDS093945

## (undated) DEVICE — ESU GROUND PAD ADULT REM W/15' CORD E7507DB

## (undated) DEVICE — DRSG XEROFORM 1X8"

## (undated) DEVICE — TUBE PENROSE 1/4 X 12 STRL 30414-025

## (undated) RX ORDER — PROPOFOL 10 MG/ML
INJECTION, EMULSION INTRAVENOUS
Status: DISPENSED
Start: 2025-01-07

## (undated) RX ORDER — PROPOFOL 10 MG/ML
INJECTION, EMULSION INTRAVENOUS
Status: DISPENSED
Start: 2025-01-09

## (undated) RX ORDER — ONDANSETRON 2 MG/ML
INJECTION INTRAMUSCULAR; INTRAVENOUS
Status: DISPENSED
Start: 2025-01-09

## (undated) RX ORDER — BUPIVACAINE HYDROCHLORIDE 5 MG/ML
INJECTION, SOLUTION EPIDURAL; INTRACAUDAL
Status: DISPENSED
Start: 2025-01-07

## (undated) RX ORDER — LIDOCAINE HYDROCHLORIDE 10 MG/ML
INJECTION, SOLUTION EPIDURAL; INFILTRATION; INTRACAUDAL; PERINEURAL
Status: DISPENSED
Start: 2025-01-09

## (undated) RX ORDER — LIDOCAINE HYDROCHLORIDE 10 MG/ML
INJECTION, SOLUTION EPIDURAL; INFILTRATION; INTRACAUDAL; PERINEURAL
Status: DISPENSED
Start: 2025-01-07

## (undated) RX ORDER — BUPIVACAINE HYDROCHLORIDE 5 MG/ML
INJECTION, SOLUTION EPIDURAL; INTRACAUDAL
Status: DISPENSED
Start: 2025-01-09

## (undated) RX ORDER — ONDANSETRON 2 MG/ML
INJECTION INTRAMUSCULAR; INTRAVENOUS
Status: DISPENSED
Start: 2025-01-07

## (undated) RX ORDER — LIDOCAINE HYDROCHLORIDE 20 MG/ML
INJECTION, SOLUTION INFILTRATION; PERINEURAL
Status: DISPENSED
Start: 2025-01-07